# Patient Record
Sex: MALE | Race: WHITE | NOT HISPANIC OR LATINO
[De-identification: names, ages, dates, MRNs, and addresses within clinical notes are randomized per-mention and may not be internally consistent; named-entity substitution may affect disease eponyms.]

---

## 2021-03-31 PROBLEM — Z00.00 ENCOUNTER FOR PREVENTIVE HEALTH EXAMINATION: Status: ACTIVE | Noted: 2021-03-31

## 2021-04-08 ENCOUNTER — TRANSCRIPTION ENCOUNTER (OUTPATIENT)
Age: 36
End: 2021-04-08

## 2021-04-09 ENCOUNTER — APPOINTMENT (OUTPATIENT)
Dept: ORTHOPEDIC SURGERY | Facility: CLINIC | Age: 36
End: 2021-04-09

## 2021-04-22 ENCOUNTER — APPOINTMENT (OUTPATIENT)
Dept: ORTHOPEDIC SURGERY | Facility: CLINIC | Age: 36
End: 2021-04-22
Payer: COMMERCIAL

## 2021-04-22 VITALS — RESPIRATION RATE: 16 BRPM | BODY MASS INDEX: 34.24 KG/M2 | WEIGHT: 290 LBS | HEIGHT: 77 IN

## 2021-04-22 DIAGNOSIS — M79.644 PAIN IN RIGHT FINGER(S): ICD-10-CM

## 2021-04-22 DIAGNOSIS — Z78.9 OTHER SPECIFIED HEALTH STATUS: ICD-10-CM

## 2021-04-22 DIAGNOSIS — Z85.9 PERSONAL HISTORY OF MALIGNANT NEOPLASM, UNSPECIFIED: ICD-10-CM

## 2021-04-22 PROCEDURE — 99204 OFFICE O/P NEW MOD 45 MIN: CPT

## 2021-04-22 PROCEDURE — 99072 ADDL SUPL MATRL&STAF TM PHE: CPT

## 2021-04-22 PROCEDURE — 73110 X-RAY EXAM OF WRIST: CPT | Mod: LT

## 2022-03-25 ENCOUNTER — APPOINTMENT (OUTPATIENT)
Dept: PHYSICAL MEDICINE AND REHAB | Facility: CLINIC | Age: 37
End: 2022-03-25
Payer: COMMERCIAL

## 2022-03-25 VITALS — SYSTOLIC BLOOD PRESSURE: 137 MMHG | OXYGEN SATURATION: 96 % | DIASTOLIC BLOOD PRESSURE: 78 MMHG | HEART RATE: 96 BPM

## 2022-03-25 DIAGNOSIS — M23.91 UNSPECIFIED INTERNAL DERANGEMENT OF RIGHT KNEE: ICD-10-CM

## 2022-03-25 DIAGNOSIS — M25.561 PAIN IN RIGHT KNEE: ICD-10-CM

## 2022-03-25 DIAGNOSIS — M22.2X1 PATELLOFEMORAL DISORDERS, RIGHT KNEE: ICD-10-CM

## 2022-03-25 DIAGNOSIS — M25.361 OTHER INSTABILITY, RIGHT KNEE: ICD-10-CM

## 2022-03-25 DIAGNOSIS — M67.959 UNSPECIFIED DISORDER OF SYNOVIUM AND TENDON, UNSPECIFIED THIGH: ICD-10-CM

## 2022-03-25 PROCEDURE — 99205 OFFICE O/P NEW HI 60 MIN: CPT

## 2022-03-25 PROCEDURE — 73564 X-RAY EXAM KNEE 4 OR MORE: CPT | Mod: RT

## 2022-03-25 NOTE — HISTORY OF PRESENT ILLNESS
[FreeTextEntry1] : Aleksandar Bynum M.D.\par Sports Medicine and Interventional Spine\par Department of Physical Medicine and Rehabilitation \par [facility] \par Email: [email or web address] <[email or web address]>\par \par [facility]\par [facility] [place] [facility]\par [address] [place], 11th Floor\[place] [address]\par \par [facility]\par [facility] [place] [facility]\par [address], 4th Floor\[place] [address]\par \par [facility] at \par [facility]\par [address], 6th Floor\[place] [address]\par \par [facility] at [facility]\par [address]\[place] [address]\par \par [facility] [place] \par [facility]\par [address]\[place] [address]\par \par For [place] Appointments\par Phone: [phone]\par Fax: [phone]\par \par For [place] Appointments\par Phone: [phone]\par Fax: [phone]\par \par \par ----------------------------------------------------------------------------------------------------------------------------------------\par \par PATIENT: SHERIF VEGA \par MRN: [id number] \par YOB: 1985 \par DATE OF VISIT: 03/25/2022 \par Referred by PARDEEP FOX\par PCP ADDRESS:\par \par Mar 25, 2022 \par \par \par Dear \par \par Thank you for referring SHERIF VEGA to my Sports and Interventional Spine practice and office. Enclosed is a copy of the patient's consultation/progress note, which includes my complete assessment and recent studies completed during the patient's evaluation.\par \par If you have questions or have any patients who require nonsurgical, non-opiate management of any sports, spine, or musculoskeletal conditions, please do not hesitate to contact my , Bernadine Lao at [phone].\par \par I look forward to taking care of your patients along with you.\par \par Sincerely,\par \par Aleksandar\par \par Aleksandar Bynum MD\par Sports, Interventional Spine, & Regenerative Musculoskeletal Medicine\par [facility] [place] [facility]\par Email: [email or web address]\par \par \par                                                   Initial Consultation:\par CC: right knee pain\par \par HPI:  This is the first visit to [facility]'s [facility] [place] [facility]ine [facility].  \par \par SHERIF VEGA presents with the chief complaint as above.  \par \par Hx:\par was on treatment over the past few weeks before coming to the office\par experiencing instability over the right knee\par occasionally patient feels that the right knee is having more valgus\par has had previous treatments for lumbar and cervical pain\par felt a pop a few months ago but was not temporally linked to his symptoms\par \par The patient’s difficulties began last month.  \par The pain is graded as 3/10, at its worst 5/10 \par The pain is described as localized to the right knee\par The pain is intermittent \par The pain does not radiate\par The patient feels that the pain is overall persistent. \par Patient denies other recent fall, MVA, injury, trauma, or accident besides presenting history above\par \par Aggravating: ambulation, prolonged standing, stairs\par Alleviating: rest, walking \par \par Meds: denies regular PO pain medications\par Therapy Program: no recent structured targeted therapy program\par HEP: doing HEP regularly\par \par Assoc Sx:\par Denies Numbness\par Denies Tingling\par Denies Focal motor weakness in the upper or lower limbs\par Denies New or worsened bowel or bladder incontinence\par Denies Saddle anesthesia\par Denies Buckling\par Denies Using Orthotic(s)\par Denies Swelling in the upper/lower extremities\par Denies Clicking\par They also deny frequent tripping, falling\par \par ROS: A 14 point review of systems was completed. Positive findings are pain as described above. The remaining systems negative.\par \par Prostate Hx: up to date\par COVID HX: reviewed\par \par Assoc Hx:\par Ambulates without assistive device, ambulating about 10k steps, \par Injection Hx: denies locally directed treatment to the area in question\par Imaging Hx: reviewed\par \par Level of functioning: indep with ambulation, indep with ADLs\par Living Situation: dwelling with steps to enter

## 2022-03-25 NOTE — DATA REVIEWED
[Plain X-Rays] : plain X-Rays [FreeTextEntry1] : Weight bearing x-rays of the RIGHT knees (AP, flexion weight bearing, lateral, and merchant views) demonstrate no significant joint space narrowing, neg fracture or dislocation, neg narrowing in the patellofemoral joint. neg effusion. \par

## 2022-03-25 NOTE — ASSESSMENT
[FreeTextEntry1] :                                                       Assessment/Plan:\par \par SHERIF VEGA is a 36 year male with acute onset right knee pain presents for initial consultation. \par \par 1. Patellofemoral pain syndrome, right\par 2. Derangement of the meniscus of the knee, right\par 3. Instability of the knee, right\par 4. Core weakness\par 5. Gluteus medius tendinopathy, right\par 6. Right knee pain\par \par - Tiers of treatment and management of above diagnosis(es) were discussed with patient\par - Optimal diet, weight, sleep, and lifestyle management to minimize stress and maximize well being counseling provided\par - Imaging reviewed and discussed with patient\par - Reviewed previous encounter notes from 4/2/2021 Dr. ETIENNE Armenta ([facility])\par - Patient was advised to start a structured, targeted therapy program 2-3x/wk for 6-8 wks with goal toward HEP\par - Patient was educated on an appropriate home exercise program, provided with exercise recommendations, all questions answered\par - Patient may trial topical diclofenac 1% gel over the right knee QID PRN\par - Patient was advised to apply cool compresses or warm heat to affected regions PRN\par - Radiographs of right knee obtained in office today, reviewed 4 projections with patient, all questions answered\par - Follow up in 2-3 months \par \par I have personally spent a total of at least 60 minutes preparing, reviewing internal and external records, explaining, counseling, and coordinating care for this patient encounter.\par \par Thank you, PARDEEP FOX, for allowing me to participate in the care of your patient. Please do not hesitate to contact me with questions/concerns.\par \par Aleksandar Bynum M.D.\par Sports and Interventional Spine\par Department of Physical Medicine and Rehabilitation \par [facility] \par Email: [email or web address]\par \par [facility] [place] \par [facility]\par [address]\[place] [address]\par \par Appointments: [phone]\par Fax: [phone]

## 2022-06-17 ENCOUNTER — APPOINTMENT (OUTPATIENT)
Dept: PHYSICAL MEDICINE AND REHAB | Facility: CLINIC | Age: 37
End: 2022-06-17

## 2022-11-23 ENCOUNTER — APPOINTMENT (OUTPATIENT)
Dept: PULMONOLOGY | Facility: CLINIC | Age: 37
End: 2022-11-23

## 2022-11-23 VITALS
DIASTOLIC BLOOD PRESSURE: 83 MMHG | BODY MASS INDEX: 34.24 KG/M2 | TEMPERATURE: 97.8 F | HEART RATE: 110 BPM | HEIGHT: 77 IN | WEIGHT: 290 LBS | SYSTOLIC BLOOD PRESSURE: 138 MMHG | OXYGEN SATURATION: 95 %

## 2022-11-23 DIAGNOSIS — Z87.891 PERSONAL HISTORY OF NICOTINE DEPENDENCE: ICD-10-CM

## 2022-11-23 PROCEDURE — 99204 OFFICE O/P NEW MOD 45 MIN: CPT

## 2022-11-23 RX ORDER — LEVOTHYROXINE SODIUM 0.2 MG/1
200 TABLET ORAL
Qty: 90 | Refills: 0 | Status: ACTIVE | COMMUNITY
Start: 2022-09-16

## 2022-11-23 NOTE — REVIEW OF SYSTEMS
[Negative] : Musculoskeletal [Lower Extremity Discomfort] : no lower extremity discomfort [Late day/ Evening symptoms] : no late day/evening symptoms

## 2022-11-23 NOTE — ASSESSMENT
[FreeTextEntry1] : This is a 37 year old male referred by Dr. RAUDEL Valderrama for evaluation of possible sleep apnea. The patient has multiple signs and symptoms of sleep-disordered breathing including snoring and unrefreshed sleep. He was referred to the [facility] for a diagnostic HST. The ramifications of CHIQUIS and its potential therapeutic modalities were discussed with the patient. He will follow up with us after the HST.\par \par \par \par

## 2022-11-23 NOTE — PHYSICAL EXAM
[General Appearance - Well Developed] : well developed [Normal Appearance] : normal appearance [Well Groomed] : well groomed [General Appearance - Well Nourished] : well nourished [No Deformities] : no deformities [General Appearance - In No Acute Distress] : no acute distress [Normal Conjunctiva] : the conjunctiva exhibited no abnormalities [Neck Appearance] : the appearance of the neck was normal [Apical Impulse] : the apical impulse was normal [Heart Rate And Rhythm] : heart rate was normal and rhythm regular [] : no respiratory distress [Respiration, Rhythm And Depth] : normal respiratory rhythm and effort [Abnormal Walk] : normal gait [Oriented To Time, Place, And Person] : oriented to person, place, and time [Impaired Insight] : insight and judgment were intact

## 2022-11-23 NOTE — CONSULT LETTER
[Dear  ___] : Dear  [unfilled], [Courtesy Letter:] : I had the pleasure of seeing your patient, [unfilled], in my office today. [Please see my note below.] : Please see my note below. [Consult Closing:] : Thank you very much for allowing me to participate in the care of this patient.  If you have any questions, please do not hesitate to contact me. [Sincerely,] : Sincerely, [FreeTextEntry3] : Jen Benitez MD\par \par [employer] at [facility]\par Pulmonary, Critical Care, and Sleep Medicine\par

## 2022-11-23 NOTE — HISTORY OF PRESENT ILLNESS
[FreeTextEntry1] : 36yo s/p thyroidectomy for carcinoma referred for snoring. Had a HST 4 years ago and was "inconclusive." Snoring is now worse. Total sleep time is about 6 to 8 hours. Does not feel refreshed and is fatigued.  [ESS] : 0

## 2022-12-22 ENCOUNTER — APPOINTMENT (OUTPATIENT)
Dept: SLEEP CENTER | Facility: HOME HEALTH | Age: 37
End: 2022-12-22

## 2022-12-22 ENCOUNTER — OUTPATIENT (OUTPATIENT)
Dept: OUTPATIENT SERVICES | Facility: HOSPITAL | Age: 37
LOS: 1 days | End: 2022-12-22
Payer: COMMERCIAL

## 2022-12-22 DIAGNOSIS — G47.33 OBSTRUCTIVE SLEEP APNEA (ADULT) (PEDIATRIC): ICD-10-CM

## 2022-12-22 PROCEDURE — 95800 SLP STDY UNATTENDED: CPT

## 2022-12-22 PROCEDURE — 95800 SLP STDY UNATTENDED: CPT | Mod: 26

## 2023-04-19 ENCOUNTER — APPOINTMENT (OUTPATIENT)
Dept: UROLOGY | Facility: CLINIC | Age: 38
End: 2023-04-19
Payer: COMMERCIAL

## 2023-04-19 VITALS
TEMPERATURE: 97.5 F | WEIGHT: 290 LBS | DIASTOLIC BLOOD PRESSURE: 85 MMHG | HEART RATE: 115 BPM | OXYGEN SATURATION: 97 % | BODY MASS INDEX: 34.24 KG/M2 | HEIGHT: 77 IN | SYSTOLIC BLOOD PRESSURE: 127 MMHG

## 2023-04-19 DIAGNOSIS — N47.1 PHIMOSIS: ICD-10-CM

## 2023-04-19 PROCEDURE — 99204 OFFICE O/P NEW MOD 45 MIN: CPT

## 2023-04-19 RX ORDER — CLOTRIMAZOLE AND BETAMETHASONE DIPROPIONATE 10; .5 MG/G; MG/G
1-0.05 CREAM TOPICAL TWICE DAILY
Qty: 2 | Refills: 5 | Status: ACTIVE | COMMUNITY
Start: 2023-04-19 | End: 1900-01-01

## 2023-04-19 NOTE — ASSESSMENT
[FreeTextEntry1] : Findings today are consistent with chronic phimosis of the penile foreskin and tethered penile frenulum.  We discussed the benign but progressive nature of this condition and methods of treatment including circumcision with plastic repair of the frenulum, dorsal slit, topical therapy. We reviewed the indications, risks, alternatives of each. I recommended he consider circumcision and repair of the frenulum as the method most likely to succeed and give him the best cosmetic outcome. He will consider these options. In the meantime, I ordered lotrisone cream to use topically. Latisha Muhammad MD\par

## 2023-04-19 NOTE — LETTER BODY
[Dear  ___] : Dear  [unfilled], [Consult Letter:] : I had the pleasure of evaluating your patient, [unfilled]. [Please see my note below.] : Please see my note below. [Consult Closing:] : Thank you very much for allowing me to participate in the care of this patient.  If you have any questions, please do not hesitate to contact me. [FreeTextEntry3] : Best Regards, \par \par Latisha Muhammad MD\par

## 2023-04-19 NOTE — HISTORY OF PRESENT ILLNESS
[FreeTextEntry1] : 38 YO M seen TODAY 4/19/2923 as NPT to establish care. He told me that he has had pain and difficulty retracting his foreskin for the last 10 - 15 years, with progression over the last year. He especially feels this ventrally. At this time, he is only able to expose the distal 1/4 of the glans. He has hot had any treatment for this so far. He also notes tearing if the foreskin with erections. This has affected neither urination nor erections.\par UA unable to leave a specimen today.\par  \par Patient also treated for hypothyroidism. NKMA.  The patient denies fevers, chills, nausea and or vomiting and no unexplained weight loss. \par All pertinent parts of the patient PFSH (past medical, family and social histories), laboratory, radiological studies and physician notes were reviewed prior to starting the face to face portion of the  visit. Questionnaire results were discussed with patient.\par

## 2023-04-19 NOTE — PHYSICAL EXAM
[General Appearance - Well Developed] : well developed [General Appearance - Well Nourished] : well nourished [Normal Appearance] : normal appearance [Well Groomed] : well groomed [General Appearance - In No Acute Distress] : no acute distress [Edema] : no peripheral edema [Respiration, Rhythm And Depth] : normal respiratory rhythm and effort [Exaggerated Use Of Accessory Muscles For Inspiration] : no accessory muscle use [Abdomen Soft] : soft [Abdomen Tenderness] : non-tender [Abdomen Hernia] : no hernia was discovered [Costovertebral Angle Tenderness] : no ~M costovertebral angle tenderness [Urethral Meatus] : meatus normal [FreeTextEntry1] : Foreskin tight, cannot be retracted more than 1/4 of the way back arvizu to tight phimotic band and frenulum that is tethered to the til of the glans nyst ventral to the neatus. There are signs of both chronic inflammation and scarring from prior laceration of the prepuce. No overt infection or bleeding at this time. [Normal Station and Gait] : the gait and station were normal for the patient's age [] : no rash [No Focal Deficits] : no focal deficits [Oriented To Time, Place, And Person] : oriented to person, place, and time [Affect] : the affect was normal [Mood] : the mood was normal [Not Anxious] : not anxious

## 2023-06-20 ENCOUNTER — APPOINTMENT (OUTPATIENT)
Dept: ULTRASOUND IMAGING | Facility: CLINIC | Age: 38
End: 2023-06-20
Payer: COMMERCIAL

## 2023-06-20 ENCOUNTER — OUTPATIENT (OUTPATIENT)
Dept: OUTPATIENT SERVICES | Facility: HOSPITAL | Age: 38
LOS: 1 days | End: 2023-06-20

## 2023-06-20 PROCEDURE — 76536 US EXAM OF HEAD AND NECK: CPT | Mod: 26

## 2023-11-02 ENCOUNTER — APPOINTMENT (OUTPATIENT)
Dept: ENDOCRINOLOGY | Facility: CLINIC | Age: 38
End: 2023-11-02
Payer: COMMERCIAL

## 2023-11-02 ENCOUNTER — NON-APPOINTMENT (OUTPATIENT)
Age: 38
End: 2023-11-02

## 2023-11-02 VITALS
DIASTOLIC BLOOD PRESSURE: 85 MMHG | WEIGHT: 296 LBS | HEIGHT: 77 IN | BODY MASS INDEX: 34.95 KG/M2 | SYSTOLIC BLOOD PRESSURE: 131 MMHG | HEART RATE: 80 BPM

## 2023-11-02 PROCEDURE — 99205 OFFICE O/P NEW HI 60 MIN: CPT

## 2023-11-09 ENCOUNTER — TRANSCRIPTION ENCOUNTER (OUTPATIENT)
Age: 38
End: 2023-11-09

## 2023-11-17 LAB
ALBUMIN SERPL ELPH-MCNC: 4.7 G/DL
ALP BLD-CCNC: 70 U/L
ALT SERPL-CCNC: 27 U/L
ANION GAP SERPL CALC-SCNC: 12 MMOL/L
AST SERPL-CCNC: 20 U/L
BILIRUB SERPL-MCNC: 0.5 MG/DL
BUN SERPL-MCNC: 11 MG/DL
CALCIUM SERPL-MCNC: 9.8 MG/DL
CHLORIDE SERPL-SCNC: 105 MMOL/L
CO2 SERPL-SCNC: 26 MMOL/L
CREAT SERPL-MCNC: 1.11 MG/DL
EGFR: 87 ML/MIN/1.73M2
ESTIMATED AVERAGE GLUCOSE: 117 MG/DL
GLUCOSE SERPL-MCNC: 100 MG/DL
HBA1C MFR BLD HPLC: 5.7 %
POTASSIUM SERPL-SCNC: 4.5 MMOL/L
PROT SERPL-MCNC: 7.8 G/DL
SODIUM SERPL-SCNC: 143 MMOL/L
T3FREE SERPL-MCNC: 4 PG/ML
T4 FREE SERPL-MCNC: 1.9 NG/DL
THYROGLOB AB SERPL-ACNC: <20 IU/ML
THYROGLOB SERPL-MCNC: <0.2 NG/ML
TSH SERPL-ACNC: 0.21 UIU/ML

## 2024-02-28 ENCOUNTER — TRANSCRIPTION ENCOUNTER (OUTPATIENT)
Age: 39
End: 2024-02-28

## 2024-03-06 ENCOUNTER — APPOINTMENT (OUTPATIENT)
Dept: ENDOCRINOLOGY | Facility: CLINIC | Age: 39
End: 2024-03-06
Payer: COMMERCIAL

## 2024-03-06 DIAGNOSIS — Z85.850 PERSONAL HISTORY OF MALIGNANT NEOPLASM OF THYROID: ICD-10-CM

## 2024-03-06 PROCEDURE — 99214 OFFICE O/P EST MOD 30 MIN: CPT

## 2024-03-06 RX ORDER — LEVOTHYROXINE SODIUM 0.2 MG/1
200 TABLET ORAL DAILY
Qty: 90 | Refills: 1 | Status: ACTIVE | COMMUNITY
Start: 2024-01-05 | End: 1900-01-01

## 2024-03-06 RX ORDER — LIOTHYRONINE SODIUM 5 UG/1
5 TABLET ORAL
Qty: 180 | Refills: 2 | Status: ACTIVE | COMMUNITY
Start: 2024-01-05 | End: 1900-01-01

## 2024-03-08 PROBLEM — Z85.850 HISTORY OF PAPILLARY ADENOCARCINOMA OF THYROID: Status: ACTIVE | Noted: 2023-11-02

## 2024-03-08 NOTE — PHYSICAL EXAM
[Alert] : alert [Well Nourished] : well nourished [No Acute Distress] : no acute distress [Well Developed] : well developed [Normal Sclera/Conjunctiva] : normal sclera/conjunctiva [Normal Oropharynx] : the oropharynx was normal [No Proptosis] : no proptosis [EOMI] : extra ocular movement intact [No LAD] : no lymphadenopathy [Well Healed Scar] : well healed scar [No Respiratory Distress] : no respiratory distress [No Accessory Muscle Use] : no accessory muscle use [Clear to Auscultation] : lungs were clear to auscultation bilaterally [Normal S1, S2] : normal S1 and S2 [Normal Rate] : heart rate was normal [Regular Rhythm] : with a regular rhythm [No Edema] : no peripheral edema [Pedal Pulses Normal] : the pedal pulses are present [Normal Bowel Sounds] : normal bowel sounds [Not Tender] : non-tender [Not Distended] : not distended [Normal Anterior Cervical Nodes] : no anterior cervical lymphadenopathy [Soft] : abdomen soft [No Spinal Tenderness] : no spinal tenderness [Spine Straight] : spine straight [No Stigmata of Cushings Syndrome] : no stigmata of Cushings Syndrome [Normal Strength/Tone] : muscle strength and tone were normal [Normal Gait] : normal gait [Acanthosis Nigricans] : no acanthosis nigricans [No Rash] : no rash [Normal Reflexes] : deep tendon reflexes were 2+ and symmetric [No Tremors] : no tremors [Oriented x3] : oriented to person, place, and time

## 2024-03-08 NOTE — ASSESSMENT
[Long Term Vascular Complications] : long term vascular complications of diabetes [Importance of Diet and Exercise] : importance of diet and exercise to improve glycemic control, achieve weight loss and improve cardiovascular health [Weight Loss] : weight loss [Levothyroxine] : The patient was instructed to take Levothyroxine on an empty stomach, separate from vitamins, and wait at least 30 minutes before eating [FreeTextEntry1] : PTC remote hx, no evidence of recurrence, due for TGB measurement, given low risk of recurrence, TSH target is ~1-3. Cont LT4 225 mcg + LT3 5 mcg BID, advised to take 2nd dose ~2 PM to avoid insomnia  Appears clinically euthyroid at this time Reassess TFTs and need for medication titration at this time. Reviewed importance of compliance and proper intake   1) Obesity, Morbid: Class I, complicated by preDM2. High risk of metabolic syndrome and future complications. Discussed options including meds, bariatric surgery and lifestyle modification. RB and alternatives discussed. Questions answered and she verbalized understanding. Refer to nutrition and start hypocaloric, hypocarb diet in addition to exercise regimen. Refer to  now. Some weight loss (3 lbs). If no 5-7% weight loss observed on f/u, will consider med initiation.

## 2024-05-01 ENCOUNTER — APPOINTMENT (OUTPATIENT)
Dept: ENDOCRINOLOGY | Facility: CLINIC | Age: 39
End: 2024-05-01
Payer: COMMERCIAL

## 2024-05-01 VITALS
SYSTOLIC BLOOD PRESSURE: 138 MMHG | HEART RATE: 112 BPM | BODY MASS INDEX: 34.51 KG/M2 | DIASTOLIC BLOOD PRESSURE: 90 MMHG | WEIGHT: 291 LBS

## 2024-05-01 DIAGNOSIS — E66.3 OVERWEIGHT: ICD-10-CM

## 2024-05-01 DIAGNOSIS — R73.03 PREDIABETES.: ICD-10-CM

## 2024-05-01 DIAGNOSIS — E03.9 HYPOTHYROIDISM, UNSPECIFIED: ICD-10-CM

## 2024-05-01 PROCEDURE — 99214 OFFICE O/P EST MOD 30 MIN: CPT | Mod: 25

## 2024-05-01 PROCEDURE — 36415 COLL VENOUS BLD VENIPUNCTURE: CPT

## 2024-05-01 NOTE — ASSESSMENT
[Long Term Vascular Complications] : long term vascular complications of diabetes [Importance of Diet and Exercise] : importance of diet and exercise to improve glycemic control, achieve weight loss and improve cardiovascular health [Weight Loss] : weight loss [Levothyroxine] : The patient was instructed to take Levothyroxine on an empty stomach, separate from vitamins, and wait at least 30 minutes before eating [FreeTextEntry1] : PTC remote hx, no evidence of recurrence, due for TGB measurement, given low risk of recurrence, TSH target is ~1-3. Cont LT4 200 mcg + LT3 5 mcg BID, advised to take 2nd dose ~2 PM to avoid insomnia  Appears clinically euthyroid at this time Reassess TFTs and need for medication titration at this time. Reviewed importance of compliance and proper intake   1) Obesity, Morbid: Class I, complicated by preDM2. High risk of metabolic syndrome and future complications. Discussed options including meds, bariatric surgery and lifestyle modification. RB and alternatives discussed. Questions answered and she verbalized understanding. Refer to nutrition and start hypocaloric, hypocarb diet in addition to exercise regimen. Refer to  now. Some weight loss (3 lbs). If no 5-7% weight loss observed on f/u, will consider med initiation.

## 2024-05-01 NOTE — PHYSICAL EXAM
[Alert] : alert [Well Nourished] : well nourished [No Acute Distress] : no acute distress [Well Developed] : well developed [Normal Sclera/Conjunctiva] : normal sclera/conjunctiva [EOMI] : extra ocular movement intact [No Proptosis] : no proptosis [Normal Oropharynx] : the oropharynx was normal [No LAD] : no lymphadenopathy [Well Healed Scar] : well healed scar [No Respiratory Distress] : no respiratory distress [No Accessory Muscle Use] : no accessory muscle use [Clear to Auscultation] : lungs were clear to auscultation bilaterally [Normal S1, S2] : normal S1 and S2 [Normal Rate] : heart rate was normal [Regular Rhythm] : with a regular rhythm [No Edema] : no peripheral edema [Pedal Pulses Normal] : the pedal pulses are present [Normal Bowel Sounds] : normal bowel sounds [Not Tender] : non-tender [Not Distended] : not distended [Soft] : abdomen soft [Normal Anterior Cervical Nodes] : no anterior cervical lymphadenopathy [No Spinal Tenderness] : no spinal tenderness [Spine Straight] : spine straight [No Stigmata of Cushings Syndrome] : no stigmata of Cushings Syndrome [Normal Gait] : normal gait [Normal Strength/Tone] : muscle strength and tone were normal [No Rash] : no rash [Acanthosis Nigricans] : no acanthosis nigricans [Normal Reflexes] : deep tendon reflexes were 2+ and symmetric [No Tremors] : no tremors [Oriented x3] : oriented to person, place, and time

## 2024-05-01 NOTE — HISTORY OF PRESENT ILLNESS
[FreeTextEntry1] : 39 y/o M w Hx of PTC s/p TT here for initial evaluation and management thyroid issues generally feels well and endorses no acute complaints. reports cc of generalized fatigue, has been an issue for years after TT. reports TT was performed in 2016, did not require STORM. latest US in 6/2023 w/o JOSEPH. last TGB in 2022 <0.2. TFTs wnl at TSH of 1 in 6/2023. reports recent adjustment in thyroid supplementation. currently on LT4 225 mcg + LT3 5 mcg BID.   3/2024 visit for follow up. reports increased tiredness but difficulty w/ sleep. compliant w/ LT4 200 mcg and LT3 5 mcg BIDm last dose at 2 PM/ did not increase to 225 as discussed, reports some anxiety as well, unrelated to LT3 intake. he otherwise denies any f/c, CP, SOB, palpitations, tremors, depressed mood, anxiety, palpitations, n/v, stool/urinary abn, skin/weight changes, heat/cold intolerance, HAs, breast/nipple changes, polyuria/polydipsia/nocturia or other complaints.

## 2024-05-03 LAB
ESTIMATED AVERAGE GLUCOSE: 114 MG/DL
HBA1C MFR BLD HPLC: 5.6 %
T3 SERPL-MCNC: 101 NG/DL
T3FREE SERPL-MCNC: 3.22 PG/ML
T4 FREE SERPL-MCNC: 1.6 NG/DL
THYROGLOB AB SERPL-ACNC: <20 IU/ML
THYROGLOB SERPL-MCNC: <0.2 NG/ML
TSH SERPL-ACNC: 8.93 UIU/ML

## 2024-05-03 RX ORDER — LEVOTHYROXINE SODIUM 0.03 MG/1
25 TABLET ORAL DAILY
Qty: 90 | Refills: 1 | Status: ACTIVE | COMMUNITY
Start: 2024-03-06 | End: 1900-01-01

## 2024-06-06 ENCOUNTER — APPOINTMENT (OUTPATIENT)
Dept: PSYCHIATRY | Facility: CLINIC | Age: 39
End: 2024-06-06
Payer: COMMERCIAL

## 2024-06-06 ENCOUNTER — OUTPATIENT (OUTPATIENT)
Dept: OUTPATIENT SERVICES | Facility: HOSPITAL | Age: 39
LOS: 1 days | Discharge: HOME | End: 2024-06-06

## 2024-06-06 PROCEDURE — 99417 PROLNG OP E/M EACH 15 MIN: CPT | Mod: 95

## 2024-06-06 PROCEDURE — 99205 OFFICE O/P NEW HI 60 MIN: CPT | Mod: 95

## 2024-06-07 ENCOUNTER — NON-APPOINTMENT (OUTPATIENT)
Age: 39
End: 2024-06-07
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
## 2024-06-07 NOTE — DISCUSSION/SUMMARY
[Low acute suicide risk] : Low acute suicide risk [No] : No [Not clinically indicated] : Safety Plan completed/updated (for individuals at risk): Not clinically indicated [Initial Plan] : Initial Plan [Able to manage surrounding demands and opportunities] : able to manage surrounding demands and opportunities [Adherent to treatment recommendations] : adherent to treatment recommendations [Articulate] : articulate [Insightful] : insightful [Cognitively intact] : cognitively intact [Intelligent] : intelligent [Motivated to participate in treatment] : motivated to participate in treatment [Motivated and ready for change] : motivated and ready for change [Health literacy] : health literacy [Financially stable] : financially stable [Part of a supportive marriage] : part of a supportive marriage [Steady employment] : steady employment [Housing stability] : housing stability [English fluency] : English fluency [Connected to healthcare] : connected to healthcare [Mental Health] : Mental Health [Initial] : Initial [every ___ months] : every [unfilled] months [___ times a week] : [unfilled] times a week [Improvement in symptoms as evidenced by:] : Improvement in symptoms as evidenced by: [None - Reason others did not participate:] : None - Reason others did not participate:  [Yes] : Yes [Psychiatric Provider/Prescriber] : Psychiatric Provider/Prescriber [FreeTextEntry2] : 08/01/2024 [FreeTextEntry3] : 06/06/2024 [FreeTextEntry1] : Emotional distress and dysregulation [FreeTextEntry4] : Better control over intense states of emotional distress  [de-identified] : Increase adaptive coping skills  [de-identified] : Manage emotional response to triggers  [FreeTextEntry5] : individual psychotherapy and psychiatry  [de-identified] : reduced symptoms of MDD and KURT [de-identified] : n/a

## 2024-06-10 ENCOUNTER — APPOINTMENT (OUTPATIENT)
Dept: PSYCHIATRY | Facility: CLINIC | Age: 39
End: 2024-06-10
Payer: COMMERCIAL

## 2024-06-10 DIAGNOSIS — F32.A DEPRESSION, UNSPECIFIED: ICD-10-CM

## 2024-06-10 DIAGNOSIS — G47.30 SLEEP APNEA, UNSPECIFIED: ICD-10-CM

## 2024-06-10 DIAGNOSIS — F41.9 ANXIETY DISORDER, UNSPECIFIED: ICD-10-CM

## 2024-06-10 PROCEDURE — G2211 COMPLEX E/M VISIT ADD ON: CPT | Mod: NC,95

## 2024-06-10 PROCEDURE — 99417 PROLNG OP E/M EACH 15 MIN: CPT | Mod: 95

## 2024-06-10 PROCEDURE — 99215 OFFICE O/P EST HI 40 MIN: CPT | Mod: 95

## 2024-06-10 RX ORDER — MODAFINIL 100 MG/1
100 TABLET ORAL DAILY
Qty: 60 | Refills: 0 | Status: ACTIVE | COMMUNITY
Start: 2024-06-10 | End: 1900-01-01

## 2024-06-10 NOTE — ACTIVE PROBLEMS
[FreeTextEntry1] : rule out autism spectrum disorder per pt's concerns/reports as well as PSTD secondary to positive screener

## 2024-06-10 NOTE — FAMILY HISTORY
[FreeTextEntry1] : Father side - depression, anxiety; sister with anxiety on medication Mother with depression and suicidal thinking in late 20s
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
## 2024-06-10 NOTE — DISCUSSION/SUMMARY
[Low acute suicide risk] : Low acute suicide risk [No] : No [Not clinically indicated] : Safety Plan completed/updated (for individuals at risk): Not clinically indicated [FreeTextEntry1] : Pt reported having passive suicidal thoughts such as "I don't know how I can continue" and "I can't live like this". He denied thoughts of actually wanting to kill himself in the last 30 days, but reported having more active thoughts of taking his life as an adolescent in high school when he told his father: "I want to throw myself out of the window." Pt denied any active intent, methods or plan both current and past, and stated he did not actually want to be dead, he just wanted to stop struggling and wanted to feel better. He reported being able to remind himself of this when he has suicidal thoughts, and listed "not wanting to hurt people who care about me" as a significant deterrent and protective factor. Pt also denied any preparatory behaviors or NSSI. Pt was deemed not in imminent risk of harm to self or others.
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
## 2024-06-10 NOTE — END OF VISIT
Please advise if patient can be excused from work today and tomorrow with the symptoms patient was experiencing in the previous note.    [Time Spent: ___ minutes] : I have spent [unfilled] minutes of time on the encounter. unsure

## 2024-06-10 NOTE — PLAN
[Admit to Program     (Add Program Admission information to a new column in the Admit/Discharge Flowsheet)] : Admit to program [Every ___ month(s)] : Medication Management: Every [unfilled] month(s) [Every ___ week(s)] : Psychotherapy: Every [unfilled] week(s) [Individual Therapy] : Individual Therapy [FreeTextEntry4] : Pt accepted to [facility] for psychiatry and other clinical services to treat his MDD and KURT. Pt will be seen for follow up appointment by Dr. Thaddeus Coombs and was added to individual psychotherapy waitlist.

## 2024-06-10 NOTE — REASON FOR VISIT
[Access issues (e.g., transportation, impaired mobility, etc.)] : due to patient's access issues [Telehealth (audio & video) - Individual/Group] : This visit was provided via telehealth using real-time 2-way audio visual technology. [Medical Office: ([facility])___] : The provider was located at the medical office in [unfilled]. [Home] : The patient, [unfilled], was located at home, [unfilled], at the time of the visit. [Verbal consent obtained from patient/other participant(s)] : Verbal consent for telehealth/telephonic services obtained from patient/other participant(s) [FreeTextEntry4] : 2:04 pm [FreeTextEntry5] : 2:45 pm [FreeTextEntry2] : N/A - intake conducted via telehealth [Patient] : Patient [FreeTextEntry1] : depression, anxiety, r/o PTSD and r/o Autism Spectrum
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
## 2024-06-10 NOTE — REVIEW OF SYSTEMS
[Negative] : Allergic/Immunologic [FreeTextEntry2] : chronically fatigued and depleted feeling [FreeTextEntry6] : significant snoring reported: ? sleep apnea ideally to be assessed with onsite testing [de-identified] : anxious, depressed

## 2024-06-10 NOTE — REASON FOR VISIT
[Number can be texted] : number can be texted [OK  to leave message] : OK  to leave message [Primary Care] : Primary Care [Non-[facility] Provider/Facility] : Non-[facility] Provider/Facility [Access issues (e.g., transportation, impaired mobility, etc.)] : due to patient's access issues [FreeTextEntry4] : 9:30 AM [FreeTextEntry3] : [email or web address] [FreeTextEntry5] : English [FreeTextEntry6] : Ramon  [FreeTextEntry7] : he/him [FreeTextEntry2] : Pt currently sees a PCP at another [facility] site and wanted to find psychiatric services within the [facility] system as well.  [FreeTextEntry1] : Pt reported to intake at [facility] seeking individual therapy, and psychiatry services to address both longterm and acute symptoms of anxiety and depression.

## 2024-06-10 NOTE — PHYSICAL EXAM
[None] : none [Cooperative] : cooperative [Euthymic] : euthymic [Full] : full [Clear] : clear [Linear/Goal Directed] : linear/goal directed [Average] : average [WNL] : within normal limits [Depressed] : depressed [Anxious] : anxious [Constricted] : constricted [FreeTextEntry1] : overweight male with thick appearing neck
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
## 2024-06-10 NOTE — PLAN
[Medication education provided] : Medication education provided. [FreeTextEntry5] : Offered support/encouragement, psychoeducation, counseling regarding: diagnosis/diagnoses, medications, symptoms, recommendations etc.  Reviewed/discussed plan below:  -Reviewed R/B/SE of Modafinil 100 mg for presumed sleep apnea (reports significant snoring, daytime fatigue and has stocky build which are all risk factors and pt willing to try this; reviewed how effects of this can be distinguished from thyroid medications effects based on time course of any effects (ie: if feeling more energized and jittery after starting Modafinil likely related to this versus feeling symptoms after an increase in thyroid meds); also discussed medication levels for thyroid as potentially helpful -Forwarded fax with note of apnea concerns to nurse to apply for Prior Auth for Modafinil as insurance requires this -Consider potential need for antidepressant/antianxiety medication and to review meds in future -May administer ASQ to evaluate for potential autism spectrum -May further discuss PTSD diagnostic symptoms to delineate if pt meets full criteria -To f/u re: individual assignment for therapy -Contact writer prn  55 minutes spent reviewing prior records/notes, seeing patient and recording session note
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
## 2024-06-10 NOTE — REVIEW OF SYSTEMS
[Negative] : Allergic/Immunologic [FreeTextEntry2] : chronically fatigued and depleted feeling [FreeTextEntry6] : significant snoring reported: ? sleep apnea to be assessed more [de-identified] : anxious, depressed

## 2024-06-10 NOTE — PHYSICAL EXAM
[None] : none [Average] : average [Cooperative] : cooperative [Depressed] : depressed [Anxious] : anxious [Constricted] : constricted [Clear] : clear [Linear/Goal Directed] : linear/goal directed [WNL] : within normal limits [FreeTextEntry1] : overweight male with stocky appearance

## 2024-06-10 NOTE — SURGICAL HISTORY
Your current Orthopaedic problem we are working together to treat is:  S/P total hip arthroplasty.    - Aspirin 325 mg daily for 1 more month  - Keep working on hip strengthening  - Ultrasound of left leg    It is recommended you schedule a follow-up appointment with Peter Hernández MD in  4 weeks.      Office hours are 8:00 am to 5:00 pm Monday through Friday. If it is urgent that you speak with someone outside of these hours, our [facility] Call Center will be able to assist you. You can reach the office by calling the:    [facility]  [address] [place] [address] [phone] during office hours  [phone] after office hours    We do highly recommend [employer], if you do not already have this. You can request access via the internet or by simply talking with a  at any of the clinics.   [email or web address].    You may receive a survey in the mail from [facility]. They mail and process patient satisfaction surveys for our clinic. Should you receive a survey, please take a few minutes to rate your experience with your visit.  We value your opinions and insights. Thank you in advance for your time and interest in responding.    Thank you for choosing [facility] as your Orthopaedic provider!    
[FreeTextEntry1] : Thyroidectomy

## 2024-06-24 ENCOUNTER — APPOINTMENT (OUTPATIENT)
Dept: PSYCHIATRY | Facility: CLINIC | Age: 39
End: 2024-06-24
Payer: COMMERCIAL

## 2024-06-24 DIAGNOSIS — F41.9 ANXIETY DISORDER, UNSPECIFIED: ICD-10-CM

## 2024-06-24 DIAGNOSIS — F32.A DEPRESSION, UNSPECIFIED: ICD-10-CM

## 2024-06-24 PROCEDURE — G2211 COMPLEX E/M VISIT ADD ON: CPT | Mod: NC,95

## 2024-06-24 PROCEDURE — 99215 OFFICE O/P EST HI 40 MIN: CPT | Mod: 95

## 2024-06-24 RX ORDER — TRAZODONE HYDROCHLORIDE 50 MG/1
50 TABLET ORAL AT BEDTIME
Qty: 30 | Refills: 0 | Status: ACTIVE | COMMUNITY
Start: 2024-06-24 | End: 1900-01-01

## 2024-06-24 RX ORDER — DESVENLAFAXINE 25 MG/1
25 TABLET, EXTENDED RELEASE ORAL DAILY
Qty: 10 | Refills: 0 | Status: ACTIVE | COMMUNITY
Start: 2024-06-24 | End: 1900-01-01

## 2024-06-28 ENCOUNTER — APPOINTMENT (OUTPATIENT)
Dept: PULMONOLOGY | Facility: CLINIC | Age: 39
End: 2024-06-28
Payer: COMMERCIAL

## 2024-06-28 DIAGNOSIS — R06.83 SNORING: ICD-10-CM

## 2024-06-28 DIAGNOSIS — F51.01 PRIMARY INSOMNIA: ICD-10-CM

## 2024-06-28 PROCEDURE — 99213 OFFICE O/P EST LOW 20 MIN: CPT

## 2024-06-28 PROCEDURE — G2211 COMPLEX E/M VISIT ADD ON: CPT | Mod: NC

## 2024-07-15 ENCOUNTER — APPOINTMENT (OUTPATIENT)
Dept: PSYCHIATRY | Facility: CLINIC | Age: 39
End: 2024-07-15
Payer: COMMERCIAL

## 2024-07-15 DIAGNOSIS — F43.10 POST-TRAUMATIC STRESS DISORDER, UNSPECIFIED: ICD-10-CM

## 2024-07-15 DIAGNOSIS — F41.9 ANXIETY DISORDER, UNSPECIFIED: ICD-10-CM

## 2024-07-15 DIAGNOSIS — F51.01 PRIMARY INSOMNIA: ICD-10-CM

## 2024-07-15 DIAGNOSIS — F32.A DEPRESSION, UNSPECIFIED: ICD-10-CM

## 2024-07-15 PROCEDURE — G2211 COMPLEX E/M VISIT ADD ON: CPT | Mod: NC,95

## 2024-07-15 PROCEDURE — 99215 OFFICE O/P EST HI 40 MIN: CPT | Mod: 95

## 2024-07-15 PROCEDURE — 99417 PROLNG OP E/M EACH 15 MIN: CPT | Mod: 95

## 2024-08-12 ENCOUNTER — APPOINTMENT (OUTPATIENT)
Dept: PSYCHIATRY | Facility: CLINIC | Age: 39
End: 2024-08-12
Payer: COMMERCIAL

## 2024-08-12 DIAGNOSIS — F51.01 PRIMARY INSOMNIA: ICD-10-CM

## 2024-08-12 DIAGNOSIS — F43.10 POST-TRAUMATIC STRESS DISORDER, UNSPECIFIED: ICD-10-CM

## 2024-08-12 PROCEDURE — 99417 PROLNG OP E/M EACH 15 MIN: CPT | Mod: 95

## 2024-08-12 PROCEDURE — 99215 OFFICE O/P EST HI 40 MIN: CPT | Mod: 95
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
## 2024-08-12 NOTE — REVIEW OF SYSTEMS
[Negative] : Allergic/Immunologic [FreeTextEntry2] : chronically fatigued feeling [FreeTextEntry6] : significant snoring reported  [de-identified] : anxious and depressed with PTSD symptoms

## 2024-08-12 NOTE — REVIEW OF SYSTEMS
[Negative] : Allergic/Immunologic [FreeTextEntry2] : chronically fatigued feeling [FreeTextEntry6] : significant snoring reported  [de-identified] : anxious and depressed with PTSD symptoms
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
## 2024-08-12 NOTE — PLAN
[Medication education provided] : Medication education provided. [Yes. details: ___] : Yes, [unfilled] [FreeTextEntry5] : Offered support/encouragement, psychoeducation, counseling regarding: diagnosis/diagnoses, medications, symptoms, recommendations etc.  Reviewed/discussed plan below:  -Pt pending f/u in house sleep evaluation; (had to reschedule due to high anxiety on day of apptmt) -Continue Pristiq 50 mg and may continue Trazodone at  mg qhs prn -Starting 1:1 therapy today, 8/12/24 -May administer ASQ in future to evaluate for potential autism spectrum if pt would like -May administer SOS-10 and CUDOs in future -Discussed potential to take prn medication (eg: Ativan) for acute anxiety reaction including flying anxiety as pt concerned about travel for international trip he and wife are planning; will meet in person in preparation for this potential script -Contact writer prn  55 minutes spent reviewing prior records/notes, seeing patient and recording session note
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
## 2024-08-12 NOTE — REASON FOR VISIT
[Access issues (e.g., transportation, impaired mobility, etc.)] : due to patient's access issues [Telehealth (audio & video) - Individual/Group] : This visit was provided via telehealth using real-time 2-way audio visual technology. [Other Location: e.g. Home (Enter Location, City,State)___] : The provider was located at [unfilled]. [Home] : The patient, [unfilled], was located at home, [unfilled], at the time of the visit. [Verbal consent obtained from patient/other participant(s)] : Verbal consent for telehealth/telephonic services obtained from patient/other participant(s) [Patient] : Patient [FreeTextEntry4] : 2:33 pm [FreeTextEntry5] : 3:04 pm [FreeTextEntry1] : treatment of depression, anxiety, PTSD and possible Autism Spectrum

## 2024-08-12 NOTE — PHYSICAL EXAM
[Average] : average [Cooperative] : cooperative [Depressed] : depressed [Anxious] : anxious [Constricted] : constricted [Clear] : clear [Linear/Goal Directed] : linear/goal directed [WNL] : within normal limits [FreeTextEntry1] : overweight male  [FreeTextEntry8] : improved
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
## 2024-08-14 NOTE — PLAN
[Psychodynamic Therapy] : Psychodynamic Therapy  [FreeTextEntry2] : Individual's Overall Goal for Treatment (in patient's own words): Treat his acute depression and anxiety symptoms.    Problem/Need I: Emotional distress and dysregulation.   Goal (In patient's words): Better control over intense states of emotional distress.   Objective A: Increase adaptive coping skills   Objective B: Manage emotional response to triggers  [de-identified] : Pt arrived on time. Pt reported feeling anxious and depressed and explored context for anxiety. Pt shared about why they're seeking treatment now and together, we explored developmental history pertinent to presenting problem. Writer provided support, empathic validation and helped pt clarify and deepen feelings. Goal I, Objective A and Objective B addressed. Pt ended session in good emotional and behavioral control. No SI/HI/AVH reported or observed. [FreeTextEntry1] : 1. Pt will continue once weekly psychotherapy with Andrés Chaudhry. 2. Pt will continue psychiatric care with Thaddeus Coombs MD. 3. Treatment plan review August, 2024.

## 2024-08-14 NOTE — ADDENDUM
[FreeTextEntry1] :  Case discussed in clinical supervision with Yaya Barry, PhD [[ ]] individual [[ x]] group (Check one) ASSESSMENT METHOD (Check all that apply): [[x ]] Case presentation [[x ]] Review of Record/Data [[ ]] Direct Observation [[ ]] Audio Recording [[ ]] Video Recording FOCUS OF SUPERVISION (Check all that apply): [[x ]] Diagnosis & Assessment [[x ]] Intervention [[ x]] Professional Conduct [[ ]] Culture and Diversity [[ ]] Scholarly Inquiry  [[ ]] Consultation [[ ]] Supervision [[ ]] Administration/Documentation

## 2024-08-14 NOTE — RISK ASSESSMENT
[FreeTextEntry8] : No suicidal ideation, plan, or intent reported [FreeTextEntry7] : No homicidal or aggressive ideation, plan, or intent reported [FreeTextEntry9] : At the present time the patient does not appear to be at imminent risk of harm to self or others

## 2024-08-14 NOTE — REASON FOR VISIT
[Patient preference] : as per patient preference [Telehealth (audio & video) - Individual/Group] : This visit was provided via telehealth using real-time 2-way audio visual technology. [Other Location: e.g. Home (Enter Location, City,State)___] : The provider was located at [unfilled]. [Home] : The patient, [unfilled], was located at home, [unfilled], at the time of the visit. [Patient] : Patient [FreeTextEntry4] : 7:00pm [FreeTextEntry5] : 8:00pm [FreeTextEntry1] : Decrease depression/anxiety

## 2024-08-14 NOTE — PLAN
[Psychodynamic Therapy] : Psychodynamic Therapy  [FreeTextEntry2] : Individual's Overall Goal for Treatment (in patient's own words): Treat his acute depression and anxiety symptoms.    Problem/Need I: Emotional distress and dysregulation.   Goal (In patient's words): Better control over intense states of emotional distress.   Objective A: Increase adaptive coping skills   Objective B: Manage emotional response to triggers  [de-identified] : Pt arrived on time. Pt reported feeling anxious and depressed and explored context for anxiety. Pt shared about why they're seeking treatment now and together, we explored developmental history pertinent to presenting problem. Writer provided support, empathic validation and helped pt clarify and deepen feelings. Goal I, Objective A and Objective B addressed. Pt ended session in good emotional and behavioral control. No SI/HI/AVH reported or observed. [FreeTextEntry1] : 1. Pt will continue once weekly psychotherapy with Andrés Chaudhry. 2. Pt will continue psychiatric care with Thaddeus Coombs MD. 3. Treatment plan review August, 2024.

## 2024-08-19 ENCOUNTER — APPOINTMENT (OUTPATIENT)
Dept: PSYCHIATRY | Facility: CLINIC | Age: 39
End: 2024-08-19

## 2024-08-20 NOTE — REASON FOR VISIT
[Patient preference] : as per patient preference [Telehealth (audio & video) - Individual/Group] : This visit was provided via telehealth using real-time 2-way audio visual technology. [Other Location: e.g. Home (Enter Location, City,State)___] : The provider was located at [unfilled]. [Home] : The patient, [unfilled], was located at home, [unfilled], at the time of the visit. [FreeTextEntry4] : 7:00pm [FreeTextEntry5] : 8:00pm [Patient] : Patient [FreeTextEntry1] : Decrease depression/anxiety
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
## 2024-08-23 ENCOUNTER — APPOINTMENT (OUTPATIENT)
Dept: SLEEP CENTER | Facility: HOSPITAL | Age: 39
End: 2024-08-23

## 2024-08-23 ENCOUNTER — OUTPATIENT (OUTPATIENT)
Dept: OUTPATIENT SERVICES | Facility: HOSPITAL | Age: 39
LOS: 1 days | End: 2024-08-23
Payer: COMMERCIAL

## 2024-08-23 DIAGNOSIS — G47.33 OBSTRUCTIVE SLEEP APNEA (ADULT) (PEDIATRIC): ICD-10-CM

## 2024-08-23 PROCEDURE — 95810 POLYSOM 6/> YRS 4/> PARAM: CPT

## 2024-08-23 PROCEDURE — 95810 POLYSOM 6/> YRS 4/> PARAM: CPT | Mod: 26

## 2024-08-26 ENCOUNTER — NON-APPOINTMENT (OUTPATIENT)
Age: 39
End: 2024-08-26

## 2024-08-26 ENCOUNTER — APPOINTMENT (OUTPATIENT)
Dept: PSYCHIATRY | Facility: CLINIC | Age: 39
End: 2024-08-26

## 2024-08-26 DIAGNOSIS — F32.A DEPRESSION, UNSPECIFIED: ICD-10-CM

## 2024-08-26 DIAGNOSIS — F43.10 POST-TRAUMATIC STRESS DISORDER, UNSPECIFIED: ICD-10-CM

## 2024-08-26 DIAGNOSIS — F41.9 ANXIETY DISORDER, UNSPECIFIED: ICD-10-CM

## 2024-08-28 NOTE — ADDENDUM
[FreeTextEntry1] :  Case discussed in clinical supervision with Yaya Barry, PhD [[ ]] individual [[ x]] group (Check one) ASSESSMENT METHOD (Check all that apply): [[x ]] Case presentation [[ ]] Review of Record/Data [[ ]] Direct Observation [[ ]] Audio Recording [[ ]] Video Recording FOCUS OF SUPERVISION (Check all that apply): [[ ]] Diagnosis & Assessment [[x ]] Intervention [[ x]] Professional Conduct [[ ]] Culture and Diversity [[ ]] Scholarly Inquiry  [[ ]] Consultation [[ ]] Supervision [[ ]] Administration/Documentation

## 2024-08-28 NOTE — PLAN
[FreeTextEntry2] : Individual's Overall Goal for Treatment (in patient's own words): Treat his acute depression and anxiety symptoms.    Problem/Need I: Emotional distress and dysregulation.   Goal (In patient's words): Better control over intense states of emotional distress.   Objective A: Increase adaptive coping skills   Objective B: Manage emotional response to triggers  [Psychodynamic Therapy] : Psychodynamic Therapy  [de-identified] : Pt arrived on time. Pt and writer spent session collaborating on August 2024 treatment plan. Writer provided empathic validation intermittently in response to anecdotes referenced by pt to support articulation of goals. Pt ended session in good emotional and behavioral control. No SI/HI/AVH reported or observed. [FreeTextEntry1] : 1. Pt will continue once weekly psychotherapy with Andrés Chaudhry. 2. Pt will continue psychiatric care with Thaddeus Coombs MD. 3. Treatment plan review August, 2024.

## 2024-09-03 NOTE — DISCUSSION/SUMMARY
[Able to manage surrounding demands and opportunities] : able to manage surrounding demands and opportunities [Able to exercise self-direction] : able to exercise self-direction [Adherent to treatment recommendations] : adherent to treatment recommendations [Articulate] : articulate [Attempting to realize their potential] : Attempting to realize their potential [Cognitively intact] : cognitively intact [Good impulse control] : good impulse control [Insightful] : insightful [Creative] : creative [Intelligent] : intelligent [Motivated to participate in treatment] : motivated to participate in treatment [Motivated and ready for change] : motivated and ready for change [Health literacy] : health literacy [Motivated to maintain or improve physical health] : motivated to maintain or improve physical health [In good health] : in good health [Financially stable] : financially stable [Has vocational interests or hobbies] : has vocational interests or hobbies [Part of a supportive marriage] : part of a supportive marriage [Part of a supportive family] : part of a supportive family [Steady employment] : steady employment [Housing stability] : housing stability [English fluency] : English fluency [Connected to healthcare] : connected to healthcare [Access to safe outdoor spaces] : access to safe outdoor spaces [Mental Health] : Mental Health [Plan Revision] : Plan Revision [Initial] : Initial [___ times a week] : [unfilled] times a week [Improvement in symptoms as evidenced by:] : Improvement in symptoms as evidenced by: [Yes] : Yes [None - Reason others did not participate:] : None - Reason others did not participate:  [every ___ months] : every [unfilled] months [FreeTextEntry1] : 08/1/2024 [FreeTextEntry2] : 2/1/2025 [FreeTextEntry4] : Get to the point where I'm not always thinking about my childhood and instead spending my energy and time thinking about my present life and interests [de-identified] : Free from concern so as to be present engaging in home life and interests [de-identified] : Being able to spend time with family free from concerns about the past  [FreeTextEntry5] : Individual psychotherapy and medication management [de-identified] : Self report and reduced symptoms of MDD and KURT [de-identified] : n/a
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
## 2024-09-03 NOTE — DISCUSSION/SUMMARY
Patient is currently enrolled in the Ambulatory Anticoagulation Clinic.  If you wish to have your patient continue in the clinic, please review and sign attached order.  Thank you.     [Able to manage surrounding demands and opportunities] : able to manage surrounding demands and opportunities [Able to exercise self-direction] : able to exercise self-direction [Adherent to treatment recommendations] : adherent to treatment recommendations [Articulate] : articulate [Attempting to realize their potential] : Attempting to realize their potential [Cognitively intact] : cognitively intact [Good impulse control] : good impulse control [Insightful] : insightful [Creative] : creative [Intelligent] : intelligent [Motivated to participate in treatment] : motivated to participate in treatment [Motivated and ready for change] : motivated and ready for change [Health literacy] : health literacy [Motivated to maintain or improve physical health] : motivated to maintain or improve physical health [In good health] : in good health [Financially stable] : financially stable [Has vocational interests or hobbies] : has vocational interests or hobbies [Part of a supportive marriage] : part of a supportive marriage [Part of a supportive family] : part of a supportive family [Steady employment] : steady employment [Housing stability] : housing stability [English fluency] : English fluency [Connected to healthcare] : connected to healthcare [Access to safe outdoor spaces] : access to safe outdoor spaces [Mental Health] : Mental Health [Plan Revision] : Plan Revision [Initial] : Initial [___ times a week] : [unfilled] times a week [Improvement in symptoms as evidenced by:] : Improvement in symptoms as evidenced by: [Yes] : Yes [None - Reason others did not participate:] : None - Reason others did not participate:  [every ___ months] : every [unfilled] months [FreeTextEntry1] : 08/1/2024 [FreeTextEntry2] : 2/1/2025 [FreeTextEntry4] : Get to the point where I'm not always thinking about my childhood and instead spending my energy and time thinking about my present life and interests [de-identified] : Free from concern so as to be present engaging in home life and interests [de-identified] : Being able to spend time with family free from concerns about the past  [FreeTextEntry5] : Individual psychotherapy and medication management [de-identified] : Self report and reduced symptoms of MDD and KURT [de-identified] : n/a

## 2024-09-03 NOTE — PHYSICAL EXAM
[1 - Monthly or less] : 1 - Monthly or less [0 - 1 or 2] : 0 - 1 or 2 [0 - Never] : 0 - Never [0 - No] : 0 - No [3] : 3 [2] : 2 [4] : 4 [5] : 5 [] : No [Individual reports tobacco use during the last 30 days?] : Individual reports tobacco use during the last 30 days? No [Individual reports use of the following tobacco products during the last 30 days?] : Individual reports use of the following tobacco products during the last 30 days? No [Individual reports current use of tobacco cessation medication or nicotine replacement therapy?] : Individual reports current use of tobacco cessation medication or nicotine replacement therapy? No [Was tobacco cessation medication and/or nicotine replacement therapy recommended?] : Was tobacco cessation medication and/or nicotine replacement therapy recommended? No [Does individual accept referral to MD for cessation medication or NRT?] : Does individual accept referral to MD for cessation medication or NRT? No [FreeTextEntry1] : 1 [SchwartzScore] : 41

## 2024-09-03 NOTE — DISCUSSION/SUMMARY
[Able to manage surrounding demands and opportunities] : able to manage surrounding demands and opportunities [Able to exercise self-direction] : able to exercise self-direction [Adherent to treatment recommendations] : adherent to treatment recommendations [Articulate] : articulate [Attempting to realize their potential] : Attempting to realize their potential [Cognitively intact] : cognitively intact [Good impulse control] : good impulse control [Insightful] : insightful [Creative] : creative [Intelligent] : intelligent [Motivated to participate in treatment] : motivated to participate in treatment [Motivated and ready for change] : motivated and ready for change [Health literacy] : health literacy [Motivated to maintain or improve physical health] : motivated to maintain or improve physical health [In good health] : in good health [Financially stable] : financially stable [Has vocational interests or hobbies] : has vocational interests or hobbies [Part of a supportive marriage] : part of a supportive marriage [Part of a supportive family] : part of a supportive family [Steady employment] : steady employment [Housing stability] : housing stability [English fluency] : English fluency [Connected to healthcare] : connected to healthcare [Access to safe outdoor spaces] : access to safe outdoor spaces [Mental Health] : Mental Health [Plan Revision] : Plan Revision [Initial] : Initial [___ times a week] : [unfilled] times a week [Improvement in symptoms as evidenced by:] : Improvement in symptoms as evidenced by: [Yes] : Yes [None - Reason others did not participate:] : None - Reason others did not participate:  [every ___ months] : every [unfilled] months [FreeTextEntry1] : 08/1/2024 [FreeTextEntry2] : 2/1/2025 [FreeTextEntry4] : Get to the point where I'm not always thinking about my childhood and instead spending my energy and time thinking about my present life and interests [de-identified] : Free from concern so as to be present engaging in home life and interests [de-identified] : Being able to spend time with family free from concerns about the past  [FreeTextEntry5] : Individual psychotherapy and medication management [de-identified] : Self report and reduced symptoms of MDD and KURT [de-identified] : n/a

## 2024-09-05 ENCOUNTER — TRANSCRIPTION ENCOUNTER (OUTPATIENT)
Age: 39
End: 2024-09-05

## 2024-09-09 ENCOUNTER — APPOINTMENT (OUTPATIENT)
Dept: PSYCHIATRY | Facility: CLINIC | Age: 39
End: 2024-09-09

## 2024-09-11 NOTE — PLAN
[FreeTextEntry2] : Individual's Overall Goal for Treatment (in patient's own words): Treat his acute depression and anxiety symptoms.    Problem/Need I: Emotional distress and dysregulation.   Goal (In patient's words): Better control over intense states of emotional distress.   Objective A: Increase adaptive coping skills   Objective B: Manage emotional response to triggers  [Psychodynamic Therapy] : Psychodynamic Therapy  [FreeTextEntry1] : 1. Pt will continue once weekly psychotherapy with Andrés Chaudhry. 2. Pt will continue psychiatric care with Thaddeus Coombs MD. 3. Treatment plan review February, 2025. [de-identified] : Pt arrived on time. Pt reported rebooking original honeymoon plans to travel to [facility] for March. Pt recounted details of his presentation prior to beginning treatment since last September which manifested as a chronic sense of "deadness" and hopelessness about the future. Writer encouraged further exploration and provided support. Writer also emphasized the importance of identifying pt's feelings in the here and now as being crucial for their work together. Pt was able to name frustration and disappointment as congruent to his experience in relation to his sister and father supported by anecdotes. Pt ended session in good emotional and behavioral control. No SI/HI/AVH reported or observed.

## 2024-09-16 ENCOUNTER — APPOINTMENT (OUTPATIENT)
Dept: PSYCHIATRY | Facility: CLINIC | Age: 39
End: 2024-09-16

## 2024-09-18 NOTE — PLAN
[FreeTextEntry2] : Individual's Overall Goal for Treatment (in patient's own words): Manage chronic anxiety and address acute trauma responses   Problem/Need I (in pt's own words): Getting better at dealing with stress and symptoms that come up when I'm faced with confrontation.   Objective A: Reduced emotional dysregulation amidst interpersonal conflict Objective B: Reduced dread or despair in the context of interpersonal conflict    Problem/Need II (in pt's own words): Get to the point where I'm not always thinking about my childhood and instead spending my energy and time thinking about my present life and interests Objective A: Free from concern so as to be present engaging in home life and interests Objective B: Being able to spend time with family free from concerns about the past [Psychodynamic Therapy] : Psychodynamic Therapy  [de-identified] : Pt arrived on time. Pt recounted various conflicts at work and with his family, all activating different levels of emotional dysregulation. Pt elaborated on his attitudes towards his sister and mother at different points in their lives and how this has affected his attitude towards himself. Writer provided support and helped pt consider an additional perspective. Pt ended session in good emotional and behavioral control. No SI/HI/AVH reported or observed. [FreeTextEntry1] : 1. Pt will continue once weekly psychotherapy with Andrés Chaudhry. 2. Pt will continue psychiatric care with Thaddeus Coombs MD. 3. Treatment plan review February, 2025.

## 2024-09-18 NOTE — REASON FOR VISIT
[Patient preference] : as per patient preference [Telehealth (audio & video) - Individual/Group] : This visit was provided via telehealth using real-time 2-way audio visual technology. [Other Location: e.g. Home (Enter Location, City,State)___] : The provider was located at [unfilled]. [Home] : The patient, [unfilled], was located at home, [unfilled], at the time of the visit. [FreeTextEntry5] : 7:45pm [FreeTextEntry4] : 7:00pm [Patient] : Patient [FreeTextEntry1] : Decrease depression/anxiety

## 2024-09-20 ENCOUNTER — APPOINTMENT (OUTPATIENT)
Dept: PULMONOLOGY | Facility: CLINIC | Age: 39
End: 2024-09-20
Payer: COMMERCIAL

## 2024-09-20 ENCOUNTER — APPOINTMENT (OUTPATIENT)
Dept: PSYCHIATRY | Facility: CLINIC | Age: 39
End: 2024-09-20
Payer: COMMERCIAL

## 2024-09-20 DIAGNOSIS — R06.83 SNORING: ICD-10-CM

## 2024-09-20 PROCEDURE — G2211 COMPLEX E/M VISIT ADD ON: CPT | Mod: NC

## 2024-09-20 PROCEDURE — 99215 OFFICE O/P EST HI 40 MIN: CPT

## 2024-09-20 PROCEDURE — 99442: CPT

## 2024-09-20 PROCEDURE — 99417 PROLNG OP E/M EACH 15 MIN: CPT
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
## 2024-09-20 NOTE — PHYSICAL EXAM
[Average] : average [Cooperative] : cooperative [Constricted] : constricted [Clear] : clear [Linear/Goal Directed] : linear/goal directed [WNL] : within normal limits [FreeTextEntry1] : overweight male  [de-identified] : wearing mask- seemed relatively full [FreeTextEntry8] : improved
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
## 2024-09-20 NOTE — PHYSICAL EXAM
[Average] : average [Cooperative] : cooperative [Constricted] : constricted [Clear] : clear [Linear/Goal Directed] : linear/goal directed [WNL] : within normal limits [FreeTextEntry1] : overweight male  [FreeTextEntry8] : improved [de-identified] : wearing mask- seemed relatively full

## 2024-09-20 NOTE — REASON FOR VISIT
[Patient] : Patient [FreeTextEntry1] : treatment of depression, anxiety, PTSD and possible Autism Spectrum
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
## 2024-09-20 NOTE — PLAN
[Yes. details: ___] : Yes, [unfilled] [FreeTextEntry5] : Offered support/encouragement, psychoeducation, counseling regarding: diagnosis/diagnoses, medications, symptoms, recommendations etc.  Reviewed/discussed plan below:  -Reviewed R/B/SE of Ativan prn for flying fears and other high anxiety moments; advised pt test out the Ativan (0.5-1.5 mg at 0.5 mg increments)  -Reviewed sleep study results as reported from pt and pt will send to writer for further review and to add to EMR -Continue Pristiq 50 mg and may continue Trazodone at  mg qhs prn; reviewed risks including priapism and reviewed ways to manage prn -Continue 1:1 therapy weekly -May administer ASQ in future to evaluate for potential autism spectrum if pt would like -Contact writer prn  55 minutes spent reviewing prior records/notes, seeing patient and recording session note
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
## 2024-09-21 RX ORDER — LORAZEPAM 0.5 MG/1
0.5 TABLET ORAL
Qty: 9 | Refills: 0 | Status: ACTIVE | COMMUNITY
Start: 2024-09-21 | End: 1900-01-01

## 2024-09-23 ENCOUNTER — APPOINTMENT (OUTPATIENT)
Dept: PSYCHIATRY | Facility: CLINIC | Age: 39
End: 2024-09-23

## 2024-09-23 NOTE — CONSULT LETTER
[Dear  ___] : Dear  [unfilled], [Courtesy Letter:] : I had the pleasure of seeing your patient, [unfilled], in my office today. [Please see my note below.] : Please see my note below. [Consult Closing:] : Thank you very much for allowing me to participate in the care of this patient.  If you have any questions, please do not hesitate to contact me. [Sincerely,] : Sincerely, [FreeTextEntry3] : Jen Benitez MD  [employer] at [facility] Pulmonary, Critical Care, and Sleep Medicine

## 2024-09-23 NOTE — REVIEW OF SYSTEMS
[Depression] : depression [Anxious] : anxious [Unusual Sleep Behavior] : no unusual sleep behavior [Hypersomnolence] : not sleeping much more than usual [Cataplexy] :  no cataplexy [Negative] : Gastrointestinal

## 2024-09-23 NOTE — HISTORY OF PRESENT ILLNESS
[Home] : at home, [unfilled] , at the time of the visit. [Medical Office: ([facility])___] : at the medical office located in  [Verbal consent obtained from patient] : the patient, [unfilled] [FreeTextEntry1] : 38yo s/p thyroidectomy for carcinoma referred for snoring. Had a HST 4 years ago and was "inconclusive." Snoring is now worse. Total sleep time is about 6 to 8 hours. Does not feel refreshed and is fatigued.  6/28/2024 Study in 2022 with mild CIHQUIS. Has had a lot of depression and anxiety. Snoring, fatigue are main issues. Dry mouth. Weight stable. Being treated for depression.  9/20/24 had PSG; would like to discuss results [ESS] : 1

## 2024-09-23 NOTE — ASSESSMENT
[FreeTextEntry1] : REVIEWED HST 2022: AHI 2.7; AHI 5.2; T88 0 PSG 2024: high N3 sleep; AHI <5; PLMs w/o arousals  38yo with hx of mild CHIQUIS. Symptoms are probably now worse and depression is more of an issue. PSG w/o CHIQUIS; with snoring; PLMs w/o arousals. Advised on improving sleep hygiene and possibly extending TST. All questions answered. Follow up as needed.

## 2024-09-26 NOTE — REASON FOR VISIT
[Patient preference] : as per patient preference [Telehealth (audio & video) - Individual/Group] : This visit was provided via telehealth using real-time 2-way audio visual technology. [Other Location: e.g. Home (Enter Location, City,State)___] : The provider was located at [unfilled]. [Home] : The patient, [unfilled], was located at home, [unfilled], at the time of the visit. [FreeTextEntry4] : 7:00pm [FreeTextEntry5] : 7:45pm [Patient] : Patient [FreeTextEntry1] : Decrease depression/anxiety

## 2024-09-26 NOTE — PLAN
[FreeTextEntry2] : Individual's Overall Goal for Treatment (in patient's own words): Manage chronic anxiety and address acute trauma responses   Problem/Need I (in pt's own words): Getting better at dealing with stress and symptoms that come up when I'm faced with confrontation.   Objective A: Experienced absence of emotional dysregulation following 50% of interpersonal conflicts   Objective B: Experienced 50% reduction of dread or despair anticipating interpersonal conflict    Problem/Need II (in pt's own words): Get to the point where I'm not always thinking about my childhood and instead spending my energy and time thinking about my present life and interests Objective A: Free from concern so as to be present engaging in home life and interests at least 4/7 days a week Objective B: 50% of interactions with family are experienced as free from concern about the past [Psychodynamic Therapy] : Psychodynamic Therapy  [de-identified] : Pt arrived on time. Pt reported sequence of events that comprised a dispute with his wife earlier in the week. Pt elaborated on a new insight she provided him regarding his stature pulling reactions from those around him. This led to us discussing messaging he internalized from his parents about his size. Writer provided support and helped pt consider an additional perspective. Pt ended session in good emotional and behavioral control. No SI/HI/AVH reported or observed. [FreeTextEntry1] : 1. Pt will continue once weekly psychotherapy with Andrés Chaudhry. 2. Pt will continue psychiatric care with Thaddeus Coombs MD. 3. Treatment plan review February, 2025.

## 2024-09-26 NOTE — ADDENDUM
[FreeTextEntry1] :  Case discussed in clinical supervision with Yaya Barry, PhD [[ ]] individual [[ x]] group (Check one) ASSESSMENT METHOD (Check all that apply): [[x ]] Case presentation [[ ]] Review of Record/Data [[ ]] Direct Observation [[ ]] Audio Recording [[ ]] Video Recording FOCUS OF SUPERVISION (Check all that apply): [[ x]] Diagnosis & Assessment [[x ]] Intervention [[ x]] Professional Conduct [[ ]] Culture and Diversity [[ ]] Scholarly Inquiry  [[ ]] Consultation [[x ]] Supervision [[ ]] Administration/Documentation

## 2024-09-30 ENCOUNTER — APPOINTMENT (OUTPATIENT)
Dept: PSYCHIATRY | Facility: CLINIC | Age: 39
End: 2024-09-30

## 2024-10-02 NOTE — PLAN
[FreeTextEntry2] : Individual's Overall Goal for Treatment (in patient's own words): Manage chronic anxiety and address acute trauma responses   Problem/Need I (in pt's own words): Getting better at dealing with stress and symptoms that come up when I'm faced with confrontation.   Objective A: Experienced absence of emotional dysregulation following 50% of interpersonal conflicts   Objective B: Experienced 50% reduction of dread or despair anticipating interpersonal conflict    Problem/Need II (in pt's own words): Get to the point where I'm not always thinking about my childhood and instead spending my energy and time thinking about my present life and interests Objective A: Free from concern so as to be present engaging in home life and interests at least 4/7 days a week Objective B: 50% of interactions with family are experienced as free from concern about the past [Psychodynamic Therapy] : Psychodynamic Therapy  [de-identified] : Pt arrived on time. 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 Pt endorsed frustration towards his parents for not supporting him but rather making his circumstances more difficult and expressed additional frustration towards feeling like his career prospects are limited. Writer provided empathic validation and helped pt clarify and deepen feelings. Pt ended session in good emotional and behavioral control. No SI/HI/AVH reported or observed. [FreeTextEntry1] : 1. Pt will continue once weekly psychotherapy with Andrés Chaudhry. 2. Pt will continue psychiatric care with Thaddeus Coombs MD. 3. Treatment plan review February, 2025.

## 2024-10-02 NOTE — ADDENDUM
[FreeTextEntry1] :  Case discussed in clinical supervision with Yaya Barry, PhD [[ ]] individual [[ x]] group (Check one) ASSESSMENT METHOD (Check all that apply): [[x ]] Case presentation [[ ]] Review of Record/Data [[ ]] Direct Observation [[ ]] Audio Recording [[ ]] Video Recording FOCUS OF SUPERVISION (Check all that apply): [[ x]] Diagnosis & Assessment [[x ]] Intervention [[ x]] Professional Conduct [[ ]] Culture and Diversity [[ ]] Scholarly Inquiry  [[ ]] Consultation [[ ]] Supervision [[ ]] Administration/Documentation

## 2024-10-02 NOTE — PLAN
[FreeTextEntry2] : Individual's Overall Goal for Treatment (in patient's own words): Manage chronic anxiety and address acute trauma responses   Problem/Need I (in pt's own words): Getting better at dealing with stress and symptoms that come up when I'm faced with confrontation.   Objective A: Experienced absence of emotional dysregulation following 50% of interpersonal conflicts   Objective B: Experienced 50% reduction of dread or despair anticipating interpersonal conflict    Problem/Need II (in pt's own words): Get to the point where I'm not always thinking about my childhood and instead spending my energy and time thinking about my present life and interests Objective A: Free from concern so as to be present engaging in home life and interests at least 4/7 days a week Objective B: 50% of interactions with family are experienced as free from concern about the past [Psychodynamic Therapy] : Psychodynamic Therapy  [de-identified] : Pt arrived on time. 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 Pt endorsed frustration towards his parents for not supporting him but rather making his circumstances more difficult and expressed additional frustration towards feeling like his career prospects are limited. Writer provided empathic validation and helped pt clarify and deepen feelings. Pt ended session in good emotional and behavioral control. No SI/HI/AVH reported or observed. [FreeTextEntry1] : 1. Pt will continue once weekly psychotherapy with Andrés Chaudhry. 2. Pt will continue psychiatric care with Thaddeus Coombs MD. 3. Treatment plan review February, 2025.

## 2024-10-07 ENCOUNTER — APPOINTMENT (OUTPATIENT)
Dept: PSYCHIATRY | Facility: CLINIC | Age: 39
End: 2024-10-07

## 2024-10-14 ENCOUNTER — APPOINTMENT (OUTPATIENT)
Dept: PSYCHIATRY | Facility: CLINIC | Age: 39
End: 2024-10-14

## 2024-10-14 DIAGNOSIS — F32.A DEPRESSION, UNSPECIFIED: ICD-10-CM

## 2024-10-14 DIAGNOSIS — F41.9 ANXIETY DISORDER, UNSPECIFIED: ICD-10-CM

## 2024-10-14 DIAGNOSIS — F43.10 POST-TRAUMATIC STRESS DISORDER, UNSPECIFIED: ICD-10-CM

## 2024-10-28 ENCOUNTER — APPOINTMENT (OUTPATIENT)
Dept: PSYCHIATRY | Facility: CLINIC | Age: 39
End: 2024-10-28
Payer: COMMERCIAL

## 2024-10-28 DIAGNOSIS — F51.01 PRIMARY INSOMNIA: ICD-10-CM

## 2024-10-28 PROCEDURE — 99214 OFFICE O/P EST MOD 30 MIN: CPT | Mod: 95

## 2024-10-28 PROCEDURE — G2211 COMPLEX E/M VISIT ADD ON: CPT | Mod: 95

## 2024-10-30 ENCOUNTER — APPOINTMENT (OUTPATIENT)
Dept: ENDOCRINOLOGY | Facility: CLINIC | Age: 39
End: 2024-10-30
Payer: COMMERCIAL

## 2024-10-30 VITALS
BODY MASS INDEX: 36.01 KG/M2 | SYSTOLIC BLOOD PRESSURE: 134 MMHG | HEIGHT: 77 IN | HEART RATE: 113 BPM | WEIGHT: 305 LBS | DIASTOLIC BLOOD PRESSURE: 83 MMHG

## 2024-10-30 DIAGNOSIS — Z85.850 PERSONAL HISTORY OF MALIGNANT NEOPLASM OF THYROID: ICD-10-CM

## 2024-10-30 DIAGNOSIS — E03.9 HYPOTHYROIDISM, UNSPECIFIED: ICD-10-CM

## 2024-10-30 DIAGNOSIS — R73.03 PREDIABETES.: ICD-10-CM

## 2024-10-30 PROCEDURE — 36415 COLL VENOUS BLD VENIPUNCTURE: CPT

## 2024-10-30 PROCEDURE — G2211 COMPLEX E/M VISIT ADD ON: CPT | Mod: NC

## 2024-10-30 PROCEDURE — 99214 OFFICE O/P EST MOD 30 MIN: CPT

## 2024-11-04 ENCOUNTER — APPOINTMENT (OUTPATIENT)
Dept: PSYCHIATRY | Facility: CLINIC | Age: 39
End: 2024-11-04

## 2024-11-06 LAB
T3 SERPL-MCNC: 123 NG/DL
T4 FREE SERPL-MCNC: 1.8 NG/DL
THYROGLOB AB SERPL-ACNC: 15.7 IU/ML
THYROGLOB SERPL-MCNC: <0.1 NG/ML
TSH SERPL-ACNC: 1.53 UIU/ML

## 2024-11-07 ENCOUNTER — RX RENEWAL (OUTPATIENT)
Age: 39
End: 2024-11-07

## 2024-11-11 ENCOUNTER — APPOINTMENT (OUTPATIENT)
Dept: PSYCHIATRY | Facility: CLINIC | Age: 39
End: 2024-11-11

## 2024-11-18 ENCOUNTER — APPOINTMENT (OUTPATIENT)
Dept: PSYCHIATRY | Facility: CLINIC | Age: 39
End: 2024-11-18

## 2024-11-25 ENCOUNTER — APPOINTMENT (OUTPATIENT)
Dept: PSYCHIATRY | Facility: CLINIC | Age: 39
End: 2024-11-25
Payer: COMMERCIAL

## 2024-11-25 PROCEDURE — G2211 COMPLEX E/M VISIT ADD ON: CPT | Mod: 95

## 2024-11-25 PROCEDURE — 99215 OFFICE O/P EST HI 40 MIN: CPT | Mod: 95

## 2024-12-01 ENCOUNTER — OUTPATIENT (OUTPATIENT)
Dept: OUTPATIENT SERVICES | Facility: HOSPITAL | Age: 39
LOS: 1 days | Discharge: HOME | End: 2024-12-01

## 2024-12-02 ENCOUNTER — APPOINTMENT (OUTPATIENT)
Dept: PSYCHIATRY | Facility: CLINIC | Age: 39
End: 2024-12-02

## 2024-12-09 ENCOUNTER — APPOINTMENT (OUTPATIENT)
Dept: PSYCHIATRY | Facility: CLINIC | Age: 39
End: 2024-12-09

## 2024-12-10 DIAGNOSIS — F32.A DEPRESSION, UNSPECIFIED: ICD-10-CM

## 2024-12-10 DIAGNOSIS — F41.9 ANXIETY DISORDER, UNSPECIFIED: ICD-10-CM

## 2024-12-16 ENCOUNTER — APPOINTMENT (OUTPATIENT)
Dept: PSYCHIATRY | Facility: CLINIC | Age: 39
End: 2024-12-16

## 2024-12-18 ENCOUNTER — APPOINTMENT (OUTPATIENT)
Dept: PSYCHIATRY | Facility: CLINIC | Age: 39
End: 2024-12-18

## 2024-12-18 DIAGNOSIS — F41.9 ANXIETY DISORDER, UNSPECIFIED: ICD-10-CM

## 2024-12-18 DIAGNOSIS — F32.A DEPRESSION, UNSPECIFIED: ICD-10-CM

## 2024-12-18 DIAGNOSIS — F43.10 POST-TRAUMATIC STRESS DISORDER, UNSPECIFIED: ICD-10-CM

## 2024-12-18 DIAGNOSIS — F51.01 PRIMARY INSOMNIA: ICD-10-CM

## 2024-12-18 PROCEDURE — 99214 OFFICE O/P EST MOD 30 MIN: CPT | Mod: 95

## 2024-12-18 PROCEDURE — G2211 COMPLEX E/M VISIT ADD ON: CPT | Mod: 95

## 2024-12-23 ENCOUNTER — APPOINTMENT (OUTPATIENT)
Dept: PSYCHIATRY | Facility: CLINIC | Age: 39
End: 2024-12-23

## 2024-12-30 ENCOUNTER — APPOINTMENT (OUTPATIENT)
Dept: PSYCHIATRY | Facility: CLINIC | Age: 39
End: 2024-12-30

## 2025-01-09 ENCOUNTER — APPOINTMENT (OUTPATIENT)
Dept: PSYCHIATRY | Facility: CLINIC | Age: 40
End: 2025-01-09

## 2025-01-13 ENCOUNTER — APPOINTMENT (OUTPATIENT)
Dept: PSYCHIATRY | Facility: CLINIC | Age: 40
End: 2025-01-13
Payer: COMMERCIAL

## 2025-01-13 DIAGNOSIS — F51.01 PRIMARY INSOMNIA: ICD-10-CM

## 2025-01-13 PROCEDURE — G2211 COMPLEX E/M VISIT ADD ON: CPT | Mod: 95

## 2025-01-13 PROCEDURE — 99214 OFFICE O/P EST MOD 30 MIN: CPT | Mod: 95

## 2025-01-16 ENCOUNTER — APPOINTMENT (OUTPATIENT)
Dept: PSYCHIATRY | Facility: CLINIC | Age: 40
End: 2025-01-16

## 2025-01-16 DIAGNOSIS — F32.A DEPRESSION, UNSPECIFIED: ICD-10-CM

## 2025-01-16 DIAGNOSIS — F41.9 ANXIETY DISORDER, UNSPECIFIED: ICD-10-CM

## 2025-01-16 DIAGNOSIS — F43.10 POST-TRAUMATIC STRESS DISORDER, UNSPECIFIED: ICD-10-CM

## 2025-01-16 RX ORDER — LORAZEPAM 0.5 MG/1
0.5 TABLET ORAL DAILY
Qty: 12 | Refills: 0 | Status: ACTIVE | COMMUNITY
Start: 2025-01-16 | End: 1900-01-01

## 2025-01-23 ENCOUNTER — APPOINTMENT (OUTPATIENT)
Dept: PSYCHIATRY | Facility: CLINIC | Age: 40
End: 2025-01-23

## 2025-01-30 ENCOUNTER — APPOINTMENT (OUTPATIENT)
Dept: PSYCHIATRY | Facility: CLINIC | Age: 40
End: 2025-01-30

## 2025-01-30 DIAGNOSIS — F43.10 POST-TRAUMATIC STRESS DISORDER, UNSPECIFIED: ICD-10-CM

## 2025-01-30 DIAGNOSIS — F41.9 ANXIETY DISORDER, UNSPECIFIED: ICD-10-CM

## 2025-01-30 DIAGNOSIS — F32.A DEPRESSION, UNSPECIFIED: ICD-10-CM

## 2025-02-06 ENCOUNTER — APPOINTMENT (OUTPATIENT)
Dept: PSYCHIATRY | Facility: CLINIC | Age: 40
End: 2025-02-06

## 2025-02-10 ENCOUNTER — APPOINTMENT (OUTPATIENT)
Dept: PSYCHIATRY | Facility: CLINIC | Age: 40
End: 2025-02-10
Payer: COMMERCIAL

## 2025-02-10 PROCEDURE — G2211 COMPLEX E/M VISIT ADD ON: CPT | Mod: 95

## 2025-02-10 PROCEDURE — 99215 OFFICE O/P EST HI 40 MIN: CPT | Mod: 95

## 2025-02-10 RX ORDER — GABAPENTIN 300 MG/1
300 CAPSULE ORAL
Qty: 88 | Refills: 0 | Status: ACTIVE | COMMUNITY
Start: 2025-02-10 | End: 1900-01-01

## 2025-02-11 ENCOUNTER — APPOINTMENT (OUTPATIENT)
Dept: PSYCHIATRY | Facility: CLINIC | Age: 40
End: 2025-02-11

## 2025-02-13 ENCOUNTER — APPOINTMENT (OUTPATIENT)
Dept: PSYCHIATRY | Facility: CLINIC | Age: 40
End: 2025-02-13

## 2025-02-20 ENCOUNTER — APPOINTMENT (OUTPATIENT)
Dept: PSYCHIATRY | Facility: CLINIC | Age: 40
End: 2025-02-20

## 2025-02-24 ENCOUNTER — APPOINTMENT (OUTPATIENT)
Dept: PSYCHIATRY | Facility: CLINIC | Age: 40
End: 2025-02-24
Payer: COMMERCIAL

## 2025-02-24 DIAGNOSIS — F51.01 PRIMARY INSOMNIA: ICD-10-CM

## 2025-02-24 PROCEDURE — G2211 COMPLEX E/M VISIT ADD ON: CPT | Mod: NC,95

## 2025-02-24 PROCEDURE — 99417 PROLNG OP E/M EACH 15 MIN: CPT | Mod: 95

## 2025-02-24 PROCEDURE — 99215 OFFICE O/P EST HI 40 MIN: CPT | Mod: 95

## 2025-02-24 RX ORDER — TRAZODONE HYDROCHLORIDE 50 MG/1
50 TABLET ORAL
Qty: 30 | Refills: 2 | Status: ACTIVE | COMMUNITY
Start: 2025-02-24 | End: 1900-01-01

## 2025-02-27 ENCOUNTER — APPOINTMENT (OUTPATIENT)
Dept: PSYCHIATRY | Facility: CLINIC | Age: 40
End: 2025-02-27

## 2025-03-03 ENCOUNTER — NON-APPOINTMENT (OUTPATIENT)
Age: 40
End: 2025-03-03

## 2025-03-04 ENCOUNTER — APPOINTMENT (OUTPATIENT)
Dept: PSYCHIATRY | Facility: CLINIC | Age: 40
End: 2025-03-04

## 2025-03-05 ENCOUNTER — APPOINTMENT (OUTPATIENT)
Dept: PSYCHIATRY | Facility: CLINIC | Age: 40
End: 2025-03-05
Payer: COMMERCIAL

## 2025-03-05 DIAGNOSIS — F32.A DEPRESSION, UNSPECIFIED: ICD-10-CM

## 2025-03-05 DIAGNOSIS — F43.10 POST-TRAUMATIC STRESS DISORDER, UNSPECIFIED: ICD-10-CM

## 2025-03-05 DIAGNOSIS — F41.9 ANXIETY DISORDER, UNSPECIFIED: ICD-10-CM

## 2025-03-05 PROCEDURE — 99417 PROLNG OP E/M EACH 15 MIN: CPT | Mod: 95

## 2025-03-05 PROCEDURE — G2211 COMPLEX E/M VISIT ADD ON: CPT | Mod: NC,95

## 2025-03-05 PROCEDURE — 99215 OFFICE O/P EST HI 40 MIN: CPT | Mod: 95

## 2025-03-05 RX ORDER — HYDROXYZINE HYDROCHLORIDE 25 MG/1
25 TABLET ORAL DAILY
Qty: 30 | Refills: 0 | Status: ACTIVE | COMMUNITY
Start: 2025-03-05 | End: 1900-01-01

## 2025-03-05 RX ORDER — CLONAZEPAM 0.5 MG/1
0.5 TABLET ORAL TWICE DAILY
Qty: 40 | Refills: 0 | Status: ACTIVE | COMMUNITY
Start: 2025-03-05 | End: 1900-01-01

## 2025-03-06 RX ORDER — LORAZEPAM 0.5 MG/1
0.5 TABLET ORAL DAILY
Qty: 8 | Refills: 0 | Status: ACTIVE | COMMUNITY
Start: 2025-03-06 | End: 1900-01-01

## 2025-03-11 ENCOUNTER — APPOINTMENT (OUTPATIENT)
Dept: PSYCHIATRY | Facility: CLINIC | Age: 40
End: 2025-03-11

## 2025-03-20 ENCOUNTER — APPOINTMENT (OUTPATIENT)
Dept: PSYCHIATRY | Facility: CLINIC | Age: 40
End: 2025-03-20

## 2025-03-20 DIAGNOSIS — F32.A DEPRESSION, UNSPECIFIED: ICD-10-CM

## 2025-03-20 DIAGNOSIS — F43.10 POST-TRAUMATIC STRESS DISORDER, UNSPECIFIED: ICD-10-CM

## 2025-03-20 DIAGNOSIS — F41.9 ANXIETY DISORDER, UNSPECIFIED: ICD-10-CM

## 2025-03-27 ENCOUNTER — APPOINTMENT (OUTPATIENT)
Dept: PSYCHIATRY | Facility: CLINIC | Age: 40
End: 2025-03-27

## 2025-04-01 ENCOUNTER — APPOINTMENT (OUTPATIENT)
Dept: PSYCHIATRY | Facility: CLINIC | Age: 40
End: 2025-04-01
Payer: COMMERCIAL

## 2025-04-01 PROCEDURE — 99215 OFFICE O/P EST HI 40 MIN: CPT | Mod: 95

## 2025-04-01 PROCEDURE — 99417 PROLNG OP E/M EACH 15 MIN: CPT | Mod: 95

## 2025-04-01 PROCEDURE — G2211 COMPLEX E/M VISIT ADD ON: CPT | Mod: NC,95

## 2025-04-01 RX ORDER — TRAZODONE HYDROCHLORIDE 100 MG/1
100 TABLET ORAL AT BEDTIME
Qty: 60 | Refills: 0 | Status: ACTIVE | COMMUNITY
Start: 2025-04-01 | End: 1900-01-01

## 2025-04-03 ENCOUNTER — APPOINTMENT (OUTPATIENT)
Dept: PSYCHIATRY | Facility: CLINIC | Age: 40
End: 2025-04-03

## 2025-04-10 ENCOUNTER — APPOINTMENT (OUTPATIENT)
Dept: PSYCHIATRY | Facility: CLINIC | Age: 40
End: 2025-04-10

## 2025-04-17 ENCOUNTER — APPOINTMENT (OUTPATIENT)
Dept: PSYCHIATRY | Facility: CLINIC | Age: 40
End: 2025-04-17

## 2025-04-21 ENCOUNTER — APPOINTMENT (OUTPATIENT)
Dept: PSYCHIATRY | Facility: CLINIC | Age: 40
End: 2025-04-21
Payer: COMMERCIAL

## 2025-04-21 PROCEDURE — 99215 OFFICE O/P EST HI 40 MIN: CPT | Mod: 95

## 2025-04-21 PROCEDURE — G2211 COMPLEX E/M VISIT ADD ON: CPT | Mod: NC,95

## 2025-04-24 ENCOUNTER — APPOINTMENT (OUTPATIENT)
Dept: PSYCHIATRY | Facility: CLINIC | Age: 40
End: 2025-04-24

## 2025-04-30 ENCOUNTER — APPOINTMENT (OUTPATIENT)
Dept: ENDOCRINOLOGY | Facility: CLINIC | Age: 40
End: 2025-04-30
Payer: COMMERCIAL

## 2025-04-30 VITALS
HEIGHT: 77 IN | SYSTOLIC BLOOD PRESSURE: 120 MMHG | WEIGHT: 299 LBS | DIASTOLIC BLOOD PRESSURE: 81 MMHG | HEART RATE: 103 BPM | BODY MASS INDEX: 35.3 KG/M2

## 2025-04-30 DIAGNOSIS — E03.9 HYPOTHYROIDISM, UNSPECIFIED: ICD-10-CM

## 2025-04-30 DIAGNOSIS — R73.03 PREDIABETES.: ICD-10-CM

## 2025-04-30 DIAGNOSIS — E66.3 OVERWEIGHT: ICD-10-CM

## 2025-04-30 DIAGNOSIS — Z85.850 PERSONAL HISTORY OF MALIGNANT NEOPLASM OF THYROID: ICD-10-CM

## 2025-04-30 PROCEDURE — 99214 OFFICE O/P EST MOD 30 MIN: CPT

## 2025-05-01 ENCOUNTER — APPOINTMENT (OUTPATIENT)
Dept: PSYCHIATRY | Facility: CLINIC | Age: 40
End: 2025-05-01

## 2025-05-01 DIAGNOSIS — F32.A DEPRESSION, UNSPECIFIED: ICD-10-CM

## 2025-05-01 DIAGNOSIS — F41.9 ANXIETY DISORDER, UNSPECIFIED: ICD-10-CM

## 2025-05-01 DIAGNOSIS — F43.10 POST-TRAUMATIC STRESS DISORDER, UNSPECIFIED: ICD-10-CM

## 2025-05-08 ENCOUNTER — APPOINTMENT (OUTPATIENT)
Dept: PSYCHIATRY | Facility: CLINIC | Age: 40
End: 2025-05-08

## 2025-05-08 ENCOUNTER — RX RENEWAL (OUTPATIENT)
Age: 40
End: 2025-05-08

## 2025-05-12 ENCOUNTER — APPOINTMENT (OUTPATIENT)
Dept: PSYCHIATRY | Facility: CLINIC | Age: 40
End: 2025-05-12
Payer: COMMERCIAL

## 2025-05-12 DIAGNOSIS — F51.01 PRIMARY INSOMNIA: ICD-10-CM

## 2025-05-12 DIAGNOSIS — F32.A DEPRESSION, UNSPECIFIED: ICD-10-CM

## 2025-05-12 DIAGNOSIS — F41.9 ANXIETY DISORDER, UNSPECIFIED: ICD-10-CM

## 2025-05-12 DIAGNOSIS — F43.10 POST-TRAUMATIC STRESS DISORDER, UNSPECIFIED: ICD-10-CM

## 2025-05-12 PROCEDURE — G2211 COMPLEX E/M VISIT ADD ON: CPT | Mod: NC,95

## 2025-05-12 PROCEDURE — 99215 OFFICE O/P EST HI 40 MIN: CPT | Mod: 95

## 2025-05-15 ENCOUNTER — APPOINTMENT (OUTPATIENT)
Dept: PSYCHIATRY | Facility: CLINIC | Age: 40
End: 2025-05-15

## 2025-05-22 ENCOUNTER — APPOINTMENT (OUTPATIENT)
Dept: PSYCHIATRY | Facility: CLINIC | Age: 40
End: 2025-05-22

## 2025-05-29 ENCOUNTER — APPOINTMENT (OUTPATIENT)
Dept: PSYCHIATRY | Facility: CLINIC | Age: 40
End: 2025-05-29

## 2025-06-05 ENCOUNTER — APPOINTMENT (OUTPATIENT)
Dept: PSYCHIATRY | Facility: CLINIC | Age: 40
End: 2025-06-05

## 2025-06-09 ENCOUNTER — APPOINTMENT (OUTPATIENT)
Dept: PSYCHIATRY | Facility: CLINIC | Age: 40
End: 2025-06-09
Payer: COMMERCIAL

## 2025-06-09 PROCEDURE — G2211 COMPLEX E/M VISIT ADD ON: CPT | Mod: NC,95

## 2025-06-09 PROCEDURE — 99215 OFFICE O/P EST HI 40 MIN: CPT | Mod: 95

## 2025-06-12 ENCOUNTER — APPOINTMENT (OUTPATIENT)
Dept: PSYCHIATRY | Facility: CLINIC | Age: 40
End: 2025-06-12

## 2025-06-23 ENCOUNTER — NON-APPOINTMENT (OUTPATIENT)
Age: 40
End: 2025-06-23

## 2025-06-24 ENCOUNTER — APPOINTMENT (OUTPATIENT)
Dept: PSYCHIATRY | Facility: CLINIC | Age: 40
End: 2025-06-24

## 2025-07-01 ENCOUNTER — APPOINTMENT (OUTPATIENT)
Dept: PSYCHIATRY | Facility: CLINIC | Age: 40
End: 2025-07-01
Payer: COMMERCIAL

## 2025-07-01 PROBLEM — M10.9 GOUT OF FOOT, UNSPECIFIED CAUSE, UNSPECIFIED CHRONICITY, UNSPECIFIED LATERALITY: Status: ACTIVE | Noted: 2025-07-01

## 2025-07-01 PROCEDURE — 99215 OFFICE O/P EST HI 40 MIN: CPT | Mod: 95

## 2025-07-01 PROCEDURE — G2211 COMPLEX E/M VISIT ADD ON: CPT | Mod: NC,95

## 2025-07-01 RX ORDER — INDOMETHACIN 50 MG/1
50 CAPSULE ORAL 3 TIMES DAILY
Refills: 0 | Status: ACTIVE | COMMUNITY
Start: 2025-07-01

## 2025-07-01 RX ORDER — ALLOPURINOL 100 MG/1
100 TABLET ORAL DAILY
Refills: 0 | Status: ACTIVE | COMMUNITY
Start: 2025-07-01

## 2025-07-29 ENCOUNTER — APPOINTMENT (OUTPATIENT)
Dept: PSYCHIATRY | Facility: CLINIC | Age: 40
End: 2025-07-29
Payer: COMMERCIAL

## 2025-07-29 DIAGNOSIS — F51.01 PRIMARY INSOMNIA: ICD-10-CM

## 2025-07-29 DIAGNOSIS — F41.9 ANXIETY DISORDER, UNSPECIFIED: ICD-10-CM

## 2025-07-29 DIAGNOSIS — F51.5 NIGHTMARE DISORDER: ICD-10-CM

## 2025-07-29 DIAGNOSIS — F32.A DEPRESSION, UNSPECIFIED: ICD-10-CM

## 2025-07-29 PROCEDURE — G2211 COMPLEX E/M VISIT ADD ON: CPT | Mod: NC,95

## 2025-07-29 PROCEDURE — 99215 OFFICE O/P EST HI 40 MIN: CPT | Mod: 95

## 2025-07-29 RX ORDER — PRAZOSIN HYDROCHLORIDE 1 MG/1
1 CAPSULE ORAL
Qty: 90 | Refills: 0 | Status: ACTIVE | COMMUNITY
Start: 2025-07-29 | End: 1900-01-01

## 2025-08-08 ENCOUNTER — RX RENEWAL (OUTPATIENT)
Age: 40
End: 2025-08-08

## 2025-08-25 ENCOUNTER — APPOINTMENT (OUTPATIENT)
Dept: PSYCHIATRY | Facility: CLINIC | Age: 40
End: 2025-08-25
Payer: COMMERCIAL

## 2025-08-25 DIAGNOSIS — F51.5 NIGHTMARE DISORDER: ICD-10-CM

## 2025-08-25 DIAGNOSIS — F41.9 ANXIETY DISORDER, UNSPECIFIED: ICD-10-CM

## 2025-08-25 DIAGNOSIS — F43.10 POST-TRAUMATIC STRESS DISORDER, UNSPECIFIED: ICD-10-CM

## 2025-08-25 DIAGNOSIS — F32.A DEPRESSION, UNSPECIFIED: ICD-10-CM

## 2025-08-25 PROCEDURE — G2211 COMPLEX E/M VISIT ADD ON: CPT | Mod: NC,95

## 2025-08-25 PROCEDURE — 99417 PROLNG OP E/M EACH 15 MIN: CPT | Mod: 95

## 2025-08-25 PROCEDURE — 99215 OFFICE O/P EST HI 40 MIN: CPT | Mod: 95
